# Patient Record
Sex: MALE | Race: ASIAN | NOT HISPANIC OR LATINO | URBAN - METROPOLITAN AREA
[De-identification: names, ages, dates, MRNs, and addresses within clinical notes are randomized per-mention and may not be internally consistent; named-entity substitution may affect disease eponyms.]

---

## 2017-08-10 ENCOUNTER — EMERGENCY (EMERGENCY)
Facility: HOSPITAL | Age: 37
LOS: 1 days | Discharge: HOME | End: 2017-08-10
Attending: EMERGENCY MEDICINE | Admitting: EMERGENCY MEDICINE
Payer: MEDICAID

## 2017-08-10 VITALS
OXYGEN SATURATION: 99 % | SYSTOLIC BLOOD PRESSURE: 106 MMHG | TEMPERATURE: 98 F | DIASTOLIC BLOOD PRESSURE: 66 MMHG | HEART RATE: 55 BPM | RESPIRATION RATE: 18 BRPM

## 2017-08-10 VITALS
OXYGEN SATURATION: 98 % | TEMPERATURE: 99 F | HEART RATE: 67 BPM | SYSTOLIC BLOOD PRESSURE: 112 MMHG | DIASTOLIC BLOOD PRESSURE: 73 MMHG | RESPIRATION RATE: 16 BRPM

## 2017-08-10 LAB
ALBUMIN SERPL ELPH-MCNC: 4.9 G/DL — SIGNIFICANT CHANGE UP (ref 3.3–5)
ALP SERPL-CCNC: 104 U/L — SIGNIFICANT CHANGE UP (ref 40–120)
ALT FLD-CCNC: 36 U/L RC — SIGNIFICANT CHANGE UP (ref 10–45)
ANION GAP SERPL CALC-SCNC: 14 MMOL/L — SIGNIFICANT CHANGE UP (ref 5–17)
AST SERPL-CCNC: 29 U/L — SIGNIFICANT CHANGE UP (ref 10–40)
BASOPHILS # BLD AUTO: 0 K/UL — SIGNIFICANT CHANGE UP (ref 0–0.2)
BASOPHILS NFR BLD AUTO: 0.3 % — SIGNIFICANT CHANGE UP (ref 0–2)
BILIRUB SERPL-MCNC: 1.3 MG/DL — HIGH (ref 0.2–1.2)
BUN SERPL-MCNC: 14 MG/DL — SIGNIFICANT CHANGE UP (ref 7–23)
CALCIUM SERPL-MCNC: 9.5 MG/DL — SIGNIFICANT CHANGE UP (ref 8.4–10.5)
CHLORIDE SERPL-SCNC: 103 MMOL/L — SIGNIFICANT CHANGE UP (ref 96–108)
CO2 SERPL-SCNC: 26 MMOL/L — SIGNIFICANT CHANGE UP (ref 22–31)
CREAT SERPL-MCNC: 0.92 MG/DL — SIGNIFICANT CHANGE UP (ref 0.5–1.3)
EOSINOPHIL # BLD AUTO: 0.1 K/UL — SIGNIFICANT CHANGE UP (ref 0–0.5)
EOSINOPHIL NFR BLD AUTO: 1.6 % — SIGNIFICANT CHANGE UP (ref 0–6)
GAS PNL BLDV: SIGNIFICANT CHANGE UP
GLUCOSE SERPL-MCNC: 96 MG/DL — SIGNIFICANT CHANGE UP (ref 70–99)
HCT VFR BLD CALC: 47.2 % — SIGNIFICANT CHANGE UP (ref 39–50)
HGB BLD-MCNC: 16.2 G/DL — SIGNIFICANT CHANGE UP (ref 13–17)
LYMPHOCYTES # BLD AUTO: 0.9 K/UL — LOW (ref 1–3.3)
LYMPHOCYTES # BLD AUTO: 10.5 % — LOW (ref 13–44)
MCHC RBC-ENTMCNC: 31.4 PG — SIGNIFICANT CHANGE UP (ref 27–34)
MCHC RBC-ENTMCNC: 34.4 GM/DL — SIGNIFICANT CHANGE UP (ref 32–36)
MCV RBC AUTO: 91.3 FL — SIGNIFICANT CHANGE UP (ref 80–100)
MONOCYTES # BLD AUTO: 0.7 K/UL — SIGNIFICANT CHANGE UP (ref 0–0.9)
MONOCYTES NFR BLD AUTO: 8 % — SIGNIFICANT CHANGE UP (ref 2–14)
NEUTROPHILS # BLD AUTO: 7.1 K/UL — SIGNIFICANT CHANGE UP (ref 1.8–7.4)
NEUTROPHILS NFR BLD AUTO: 79.6 % — HIGH (ref 43–77)
PLATELET # BLD AUTO: 166 K/UL — SIGNIFICANT CHANGE UP (ref 150–400)
POTASSIUM SERPL-MCNC: 5 MMOL/L — SIGNIFICANT CHANGE UP (ref 3.5–5.3)
POTASSIUM SERPL-SCNC: 5 MMOL/L — SIGNIFICANT CHANGE UP (ref 3.5–5.3)
PROT SERPL-MCNC: 7.8 G/DL — SIGNIFICANT CHANGE UP (ref 6–8.3)
RBC # BLD: 5.16 M/UL — SIGNIFICANT CHANGE UP (ref 4.2–5.8)
RBC # FLD: 11.6 % — SIGNIFICANT CHANGE UP (ref 10.3–14.5)
SODIUM SERPL-SCNC: 143 MMOL/L — SIGNIFICANT CHANGE UP (ref 135–145)
WBC # BLD: 8.9 K/UL — SIGNIFICANT CHANGE UP (ref 3.8–10.5)
WBC # FLD AUTO: 8.9 K/UL — SIGNIFICANT CHANGE UP (ref 3.8–10.5)

## 2017-08-10 PROCEDURE — 82947 ASSAY GLUCOSE BLOOD QUANT: CPT

## 2017-08-10 PROCEDURE — 96375 TX/PRO/DX INJ NEW DRUG ADDON: CPT

## 2017-08-10 PROCEDURE — 85027 COMPLETE CBC AUTOMATED: CPT

## 2017-08-10 PROCEDURE — 74177 CT ABD & PELVIS W/CONTRAST: CPT

## 2017-08-10 PROCEDURE — 96374 THER/PROPH/DIAG INJ IV PUSH: CPT | Mod: XU

## 2017-08-10 PROCEDURE — 99285 EMERGENCY DEPT VISIT HI MDM: CPT | Mod: 25

## 2017-08-10 PROCEDURE — 99284 EMERGENCY DEPT VISIT MOD MDM: CPT | Mod: 25

## 2017-08-10 PROCEDURE — 85014 HEMATOCRIT: CPT

## 2017-08-10 PROCEDURE — 82435 ASSAY OF BLOOD CHLORIDE: CPT

## 2017-08-10 PROCEDURE — 80053 COMPREHEN METABOLIC PANEL: CPT

## 2017-08-10 PROCEDURE — 82803 BLOOD GASES ANY COMBINATION: CPT

## 2017-08-10 PROCEDURE — 84132 ASSAY OF SERUM POTASSIUM: CPT

## 2017-08-10 PROCEDURE — 83605 ASSAY OF LACTIC ACID: CPT

## 2017-08-10 PROCEDURE — 84295 ASSAY OF SERUM SODIUM: CPT

## 2017-08-10 PROCEDURE — 74177 CT ABD & PELVIS W/CONTRAST: CPT | Mod: 26

## 2017-08-10 PROCEDURE — 82330 ASSAY OF CALCIUM: CPT

## 2017-08-10 RX ORDER — SODIUM CHLORIDE 9 MG/ML
1000 INJECTION INTRAMUSCULAR; INTRAVENOUS; SUBCUTANEOUS ONCE
Qty: 0 | Refills: 0 | Status: COMPLETED | OUTPATIENT
Start: 2017-08-10 | End: 2017-08-10

## 2017-08-10 RX ORDER — PIPERACILLIN AND TAZOBACTAM 4; .5 G/20ML; G/20ML
3.38 INJECTION, POWDER, LYOPHILIZED, FOR SOLUTION INTRAVENOUS ONCE
Qty: 0 | Refills: 0 | Status: COMPLETED | OUTPATIENT
Start: 2017-08-10 | End: 2017-08-10

## 2017-08-10 RX ORDER — KETOROLAC TROMETHAMINE 30 MG/ML
30 SYRINGE (ML) INJECTION ONCE
Qty: 0 | Refills: 0 | Status: DISCONTINUED | OUTPATIENT
Start: 2017-08-10 | End: 2017-08-10

## 2017-08-10 RX ADMIN — Medication 30 MILLIGRAM(S): at 07:05

## 2017-08-10 RX ADMIN — SODIUM CHLORIDE 1000 MILLILITER(S): 9 INJECTION INTRAMUSCULAR; INTRAVENOUS; SUBCUTANEOUS at 06:14

## 2017-08-10 RX ADMIN — PIPERACILLIN AND TAZOBACTAM 200 GRAM(S): 4; .5 INJECTION, POWDER, LYOPHILIZED, FOR SOLUTION INTRAVENOUS at 11:35

## 2017-08-10 RX ADMIN — Medication 30 MILLIGRAM(S): at 06:56

## 2017-08-10 NOTE — CONSULT NOTE ADULT - ATTENDING COMMENTS
Agree with A/P. Discussed with resident. If pt can tolerate po, can be d/c home with po antibiotics with instructions to return to ED if symptoms worsen, and to f/u with PMD in 1-2 weeks and/or surgery office.

## 2017-08-10 NOTE — ED ADULT NURSE NOTE - OBJECTIVE STATEMENT
35 y/o M, A&Ox3, enters ED w/ c/o abd. pain. Pt. reports pain started 2 nights ago after eating calvin chicken. As per pt., wife thought chicken may have been undercooked but wife and children ate chicken and did not experience any similar symptoms. Pt. reports he thought he had diarrhea but realized he did not. Last BM was around 0100 on 8/10. Denies blood in stool. Denies hematuria/dysuria/fever/chills/n/v/SOB/chest pain. No back pain. Pt. reports pain is at a 2-3 when there is no pressure but goes up to a 7 or 8 upon palpation. Pt. states pains come and go and describes them as "shooting." Tender to the RLQ - pt. still has appendix. Negative rebound tenderness. Active bs in all 4 quadrants. Skin warm, dry & intact.

## 2017-08-10 NOTE — CONSULT NOTE ADULT - SUBJECTIVE AND OBJECTIVE BOX
ACUTE CARE SURGERY CONSULT    Consulting surgical team: [facility] - Acute Care Surgery (pager [phone])  Consulting attending: Lebron  Patient seen and examined: 08-10-17 @ 10:10    HPI:  Patient is a 36y Male, h/o Hep B (on tenofovir disoproxil), presents reporting 2 days of abdominal pain. Began as diffuse bloating/discomfort after dinner (3/10) and localized to the RLQ last evening (sharp, intermittent pain, 7/10). One episode of diarrhea soon after pain began, no N/V, last meal was breakfast this morning. Denies fevers/chills. Passing flatus.   In addition, reports that he was seen by a cardiologist (whose name he cannot recall) after referral from his PMD (Mo June, [phone]) for an issue that was seen on his EKG during routine check up. Reports no exercise intolerance, SOB or difficulty with activities of daily living.      PAST MEDICAL HISTORY:  Hepatitis B      PAST SURGICAL HISTORY:  No significant past surgical history      ALLERGIES:  No Known Allergies      MEDICATIONS  (STANDING):  piperacillin/tazobactam IVPB. 3.375 Gram(s) IV Intermittent once    MEDICATIONS  (PRN):      VITALS & I/Os:  Vital Signs Last 24 Hrs  T(C): 36.4 (10 Aug 2017 07:24), Max: 37.2 (10 Aug 2017 04:30)  T(F): 97.5 (10 Aug 2017 07:24), Max: 98.9 (10 Aug 2017 04:30)  HR: 63 (10 Aug 2017 07:24) (63 - 67)  BP: 95/65 (10 Aug 2017 07:24) (95/65 - 112/73)  BP(mean): --  RR: 16 (10 Aug 2017 07:24) (16 - 16)  SpO2: 98% (10 Aug 2017 07:24) (98% - 99%)  CAPILLARY BLOOD GLUCOSE        GEN: NAD, alert and oriented x 3  HEENT: WNL  CHEST: Symmetrical chest rise, breath sounds CTAB  HEART: RRR, non-muffled heart sounds  ABD: Soft, non-distended. Tender to percussion and palpation in RLQ, no rebound, no guarding. Rovsing's sign absent.  EXT/VASC:         LABS:                        16.2   8.9   )-----------( 166      ( 10 Aug 2017 05:52 )             47.2     08-10    143  |  103  |  14  ----------------------------<  96  5.0   |  26  |  0.92    Ca    9.5      10 Aug 2017 05:22    TPro  7.8  /  Alb  4.9  /  TBili  1.3<H>  /  DBili  x   /  AST  29  /  ALT  36  /  AlkPhos  104  08-10    Lactate: piperacillin/tazobactam IVPB. 3.375 Gram(s) IV Intermittent once             IMAGING:  ACUTE CARE SURGERY CONSULT    Consulting surgical team: [facility] - Acute Care Surgery (pager [phone])  Consulting attending:  Patient seen and examined: 08-10-17 @ 10:10    HPI:  Patient is a 36y Male    HPI:      PAST MEDICAL HISTORY:  Hepatitis B      PAST SURGICAL HISTORY:  No significant past surgical history      ALLERGIES:  No Known Allergies      MEDICATIONS  (STANDING):  piperacillin/tazobactam IVPB. 3.375 Gram(s) IV Intermittent once    MEDICATIONS  (PRN):      VITALS & I/Os:  Vital Signs Last 24 Hrs  T(C): 36.4 (10 Aug 2017 07:24), Max: 37.2 (10 Aug 2017 04:30)  T(F): 97.5 (10 Aug 2017 07:24), Max: 98.9 (10 Aug 2017 04:30)  HR: 63 (10 Aug 2017 07:24) (63 - 67)  BP: 95/65 (10 Aug 2017 07:24) (95/65 - 112/73)  BP(mean): --  RR: 16 (10 Aug 2017 07:24) (16 - 16)  SpO2: 98% (10 Aug 2017 07:24) (98% - 99%)  CAPILLARY BLOOD GLUCOSE          I&O's Summary        GEN: NAD, alert and oriented x 3  HEENT: WNL  CHEST: Symmetrical chest rise, breath sounds CTAB  HEART: RRR, non-muffled heart sounds  ABD: Soft, non-tender, non-distended  EXT/VASC:         LABS:                        16.2   8.9   )-----------( 166      ( 10 Aug 2017 05:52 )             47.2     08-10    143  |  103  |  14  ----------------------------<  96  5.0   |  26  |  0.92    Ca    9.5      10 Aug 2017 05:22    TPro  7.8  /  Alb  4.9  /  TBili  1.3<H>  /  DBili  x   /  AST  29  /  ALT  36  /  AlkPhos  104  08-10    Lactate: piperacillin/tazobactam IVPB. 3.375 Gram(s) IV Intermittent once             IMAGING:  < from: CT Abdomen and Pelvis w/ Oral Cont and w/ IV Cont (08.10.17 @ 08:39) >  BOWEL: Inflammatory changes in the right lower quadrant are centered   anteriorly around what appears to represent a cecal diverticulum (series   2, image 77), with adjacent linear thickening of the peritoneal   reflection. The appendix is located more posteriorly, away from   inflammatory process, and is slightly dilated proximally to 7-8 mm,   although the appendiceal body and tip are normal caliber. The appendix   however does not fill with oral contrast. No calcified appendicolith is   noted. No bowel obstruction.     < end of copied text >  < from: CT Abdomen and Pelvis w/ Oral Cont and w/ IV Cont (08.10.17 @ 08:39) >    IMPRESSION:   Right lower quadrant inflammatory changes, which appear to be more   centered around an anterior cecal diverticulum or epiploic appendage.   However, the appendix is slightly dilated and does not fill with oral   contrast. Cecal diverticulitis or epiploic appendagitis are favored over   acute appendicitis in the differential diagnosis. Correlation with   clinical parameters and close follow-up are necessary.    < end of copied text >      ASSESSMENT & PLAN   36y with cecal diverticulitis/appendagitis vs. acute appendicitis. Will discuss with attending.      Discussed with    ASSESSMENT & PLAN   36y      Discussed with ACUTE CARE SURGERY CONSULT    Consulting surgical team: [facility] - Acute Care Surgery (pager [phone])  Consulting attending: Lebron  Patient seen and examined: 08-10-17 @ 10:10    HPI:  Patient is a 36y Male, h/o Hep B (on tenofovir disoproxil), presents reporting 2 days of abdominal pain. Began as diffuse bloating/discomfort after dinner (3/10) and localized to the RLQ last evening (sharp, intermittent pain, 7/10). One episode of diarrhea soon after pain began, no N/V, last meal was breakfast this morning. Denies fevers/chills. Passing flatus.   In addition, reports that he was seen by a cardiologist (whose name he cannot recall) after referral from his PMD (Mo June, [phone]) for an issue that was seen on his EKG during routine check up. Reports no exercise intolerance, SOB or difficulty with activities of daily living.      PAST MEDICAL HISTORY:  Hepatitis B      PAST SURGICAL HISTORY:  No significant past surgical history      ALLERGIES:  No Known Allergies      MEDICATIONS  (STANDING):  piperacillin/tazobactam IVPB. 3.375 Gram(s) IV Intermittent once    MEDICATIONS  (PRN):      VITALS & I/Os:  Vital Signs Last 24 Hrs  T(C): 36.4 (10 Aug 2017 07:24), Max: 37.2 (10 Aug 2017 04:30)  T(F): 97.5 (10 Aug 2017 07:24), Max: 98.9 (10 Aug 2017 04:30)  HR: 63 (10 Aug 2017 07:24) (63 - 67)  BP: 95/65 (10 Aug 2017 07:24) (95/65 - 112/73)  BP(mean): --  RR: 16 (10 Aug 2017 07:24) (16 - 16)  SpO2: 98% (10 Aug 2017 07:24) (98% - 99%)  CAPILLARY BLOOD GLUCOSE        GEN: NAD, alert and oriented x 3  HEENT: WNL  CHEST: Symmetrical chest rise, breath sounds CTAB  HEART: RRR, non-muffled heart sounds  ABD: Soft, non-distended. Tender to percussion and palpation in RLQ, no rebound, no guarding. Rovsing's sign absent.  EXT/VASC:         LABS:                        16.2   8.9   )-----------( 166      ( 10 Aug 2017 05:52 )             47.2     08-10    143  |  103  |  14  ----------------------------<  96  5.0   |  26  |  0.92    Ca    9.5      10 Aug 2017 05:22    TPro  7.8  /  Alb  4.9  /  TBili  1.3<H>  /  DBili  x   /  AST  29  /  ALT  36  /  AlkPhos  104  08-10    Lactate: piperacillin/tazobactam IVPB. 3.375 Gram(s) IV Intermittent once             IMAGING:  ACUTE CARE SURGERY CONSULT    Consulting surgical team: [facility] - Acute Care Surgery (pager [phone])  Consulting attending:  Patient seen and examined: 08-10-17 @ 10:10    HPI:  Patient is a 36y Male    HPI:      PAST MEDICAL HISTORY:  Hepatitis B      PAST SURGICAL HISTORY:  No significant past surgical history      ALLERGIES:  No Known Allergies      MEDICATIONS  (STANDING):  piperacillin/tazobactam IVPB. 3.375 Gram(s) IV Intermittent once    MEDICATIONS  (PRN):      VITALS & I/Os:  Vital Signs Last 24 Hrs  T(C): 36.4 (10 Aug 2017 07:24), Max: 37.2 (10 Aug 2017 04:30)  T(F): 97.5 (10 Aug 2017 07:24), Max: 98.9 (10 Aug 2017 04:30)  HR: 63 (10 Aug 2017 07:24) (63 - 67)  BP: 95/65 (10 Aug 2017 07:24) (95/65 - 112/73)  BP(mean): --  RR: 16 (10 Aug 2017 07:24) (16 - 16)  SpO2: 98% (10 Aug 2017 07:24) (98% - 99%)  CAPILLARY BLOOD GLUCOSE          I&O's Summary        GEN: NAD, alert and oriented x 3  HEENT: WNL  CHEST: Symmetrical chest rise, breath sounds CTAB  HEART: RRR, non-muffled heart sounds  ABD: Soft, non-tender, non-distended  EXT/VASC:         LABS:                        16.2   8.9   )-----------( 166      ( 10 Aug 2017 05:52 )             47.2     08-10    143  |  103  |  14  ----------------------------<  96  5.0   |  26  |  0.92    Ca    9.5      10 Aug 2017 05:22    TPro  7.8  /  Alb  4.9  /  TBili  1.3<H>  /  DBili  x   /  AST  29  /  ALT  36  /  AlkPhos  104  08-10    Lactate: piperacillin/tazobactam IVPB. 3.375 Gram(s) IV Intermittent once             IMAGING:  < from: CT Abdomen and Pelvis w/ Oral Cont and w/ IV Cont (08.10.17 @ 08:39) >  BOWEL: Inflammatory changes in the right lower quadrant are centered   anteriorly around what appears to represent a cecal diverticulum (series   2, image 77), with adjacent linear thickening of the peritoneal   reflection. The appendix is located more posteriorly, away from   inflammatory process, and is slightly dilated proximally to 7-8 mm,   although the appendiceal body and tip are normal caliber. The appendix   however does not fill with oral contrast. No calcified appendicolith is   noted. No bowel obstruction.     < end of copied text >  < from: CT Abdomen and Pelvis w/ Oral Cont and w/ IV Cont (08.10.17 @ 08:39) >    IMPRESSION:   Right lower quadrant inflammatory changes, which appear to be more   centered around an anterior cecal diverticulum or epiploic appendage.   However, the appendix is slightly dilated and does not fill with oral   contrast. Cecal diverticulitis or epiploic appendagitis are favored over   acute appendicitis in the differential diagnosis. Correlation with   clinical parameters and close follow-up are necessary.    < end of copied text >      ASSESSMENT & PLAN   36y with cecal diverticulitis/appendagitis vs. acute appendicitis. Given anatomy on imaging, low suspicion for acute appendicitis. Recommend course of PO antibiotics (augmentin) and follow up with his PMD (Mo June). If he has worsening pain or fever at home, he should report back to the ED for re-evaluation.      Discussed with attending, Dr. Diana

## 2017-08-10 NOTE — ED ADULT NURSE REASSESSMENT NOTE - NS ED NURSE REASSESS COMMENT FT1
Report received from JAJA Orellana. Pt. A+Ox3, appears comfortable in stretcher. Breathing unlabored on RA. Pt. reports improvement in RLQ pain. Afebrile at this time. Denies N/V. Comfort and safety maintained. Pending CT scan.

## 2017-08-10 NOTE — ED PROVIDER NOTE - ATTENDING CONTRIBUTION TO CARE
I was physically present for the E/M service provided. I agree with above history, physical, and plan which I have reviewed and edited where appropriate. I was physically present for the key portions of the service provided.    36M PMH HepB p/w 2 days of RLQ pain a/w loose stools. No N/V.  -NAD, abdomen soft w/ focal RLQ pain no rebound  -CT a/p r/o appendicitis  -Labs, NPO

## 2017-08-10 NOTE — ED PROVIDER NOTE - NOTES
Spoke with surgery, recommend Augmentin, unlikely appendicitis at this time. Will DC home with strict return precautions and gastroenterology follow up. Zoe Núñez DO

## 2017-08-10 NOTE — ED ADULT NURSE REASSESSMENT NOTE - NS ED NURSE REASSESS COMMENT FT1
pt. in no acute distress. ambulatory to bathroom. reports improvement in pain. warm blankets provided. pending dispo.

## 2017-08-10 NOTE — ED PROVIDER NOTE - MEDICAL DECISION MAKING DETAILS
36M PMH hepatitis b p/w RLQ pain concerning for acute appendicitis. Will check CBC CMP and CT abd/pelvis

## 2017-08-10 NOTE — ED PROVIDER NOTE - OBJECTIVE STATEMENT
36M PMH Hepatitis B dx as 11y old p/w RLQ pain. Pain started 2 days ago as generalized abd pain, associated with an episode of loose stools. Pain has been getting worse and is now sharp pain in the RLQ. No associated n/v, fevers, chills, cough, recent travel or new foods. Denies bloody stool or melena.

## 2017-09-01 ENCOUNTER — TRANSCRIPTION ENCOUNTER (OUTPATIENT)
Age: 37
End: 2017-09-01

## 2018-03-01 NOTE — ED ADULT TRIAGE NOTE - ESI TRIAGE ACUITY LEVEL, MLM
Problem: Patient Care Overview  Goal: Plan of Care Review  Outcome: Ongoing (interventions implemented as appropriate)  Pt ambulates independently and remained free from falls and injury. Pt with frequent complaints of pain to mouth during shift, prn medications given per orders and Dr. Santiago notified. Pt had 2 liquid BMs overnight. Vital signs remained stable. Bed in lowest position, nonskid footwear on pt when out of bed, call light and possessions within reach, side rails up x2. Pt voices understanding to call for assistance. Will continue to monitor.           3

## 2020-06-17 NOTE — ED ADULT NURSE NOTE - NS ED NURSE RECORD ANOTHER HT AND WT
No oropharyngeal dysphagia due to  dementia   -Peg placed on 06/02  -Tolerates feeding  -NPO. continue meds through peg tube

## 2020-11-03 PROBLEM — B19.10 UNSPECIFIED VIRAL HEPATITIS B WITHOUT HEPATIC COMA: Chronic | Status: ACTIVE | Noted: 2017-08-10

## 2020-11-25 ENCOUNTER — LABORATORY RESULT (OUTPATIENT)
Age: 40
End: 2020-11-25

## 2020-11-25 ENCOUNTER — NON-APPOINTMENT (OUTPATIENT)
Age: 40
End: 2020-11-25

## 2020-11-25 ENCOUNTER — TRANSCRIPTION ENCOUNTER (OUTPATIENT)
Age: 40
End: 2020-11-25

## 2020-11-25 ENCOUNTER — APPOINTMENT (OUTPATIENT)
Dept: PULMONOLOGY | Facility: CLINIC | Age: 40
End: 2020-11-25
Payer: COMMERCIAL

## 2020-11-25 VITALS
SYSTOLIC BLOOD PRESSURE: 109 MMHG | DIASTOLIC BLOOD PRESSURE: 71 MMHG | HEIGHT: 67 IN | RESPIRATION RATE: 16 BRPM | HEART RATE: 73 BPM | OXYGEN SATURATION: 95 % | BODY MASS INDEX: 26.68 KG/M2 | TEMPERATURE: 97.9 F | WEIGHT: 170 LBS

## 2020-11-25 LAB
BILIRUB UR QL STRIP: NORMAL
CLARITY UR: CLEAR
COLLECTION METHOD: NORMAL
GLUCOSE UR-MCNC: NORMAL
HCG UR QL: 0.2 EU/DL
HGB UR QL STRIP.AUTO: NORMAL
KETONES UR-MCNC: NORMAL
LEUKOCYTE ESTERASE UR QL STRIP: NORMAL
NITRITE UR QL STRIP: NORMAL
PH UR STRIP: 6
PROT UR STRIP-MCNC: NORMAL
SP GR UR STRIP: 1.01

## 2020-11-25 PROCEDURE — 93000 ELECTROCARDIOGRAM COMPLETE: CPT

## 2020-11-25 PROCEDURE — 36415 COLL VENOUS BLD VENIPUNCTURE: CPT

## 2020-11-25 PROCEDURE — 81003 URINALYSIS AUTO W/O SCOPE: CPT | Mod: QW

## 2020-11-25 PROCEDURE — 90686 IIV4 VACC NO PRSV 0.5 ML IM: CPT

## 2020-11-25 PROCEDURE — 82044 UR ALBUMIN SEMIQUANTITATIVE: CPT | Mod: QW

## 2020-11-25 PROCEDURE — 99385 PREV VISIT NEW AGE 18-39: CPT | Mod: 25

## 2020-11-25 PROCEDURE — G0008: CPT

## 2020-11-25 NOTE — DATA REVIEWED
[FreeTextEntry1] : EKG shows sinus rhythm at 64 bpm, no acute ST changes.\par \par \par  POCT - A Urinalysis Dip (Automated)             Final\par \par No Documents Attached\par \par \par   Test   Result   Flag Reference Goal Last Verified \par   Glucose neg      REQUIRED \par   Bilirubin neg      REQUIRED \par   Ketone neg      REQUIRED \par   Specific Gravity 1.015      REQUIRED \par   Blood trace-intact      REQUIRED \par   pH 6.0      REQUIRED \par   Protein neg      REQUIRED \par   Urobilinogen 0.2 EU/dl      REQUIRED \par   Nitrite neg      REQUIRED \par   Leukocytes neg      REQUIRED \par   Clarity Clear      REQUIRED \par   Collection Method Void      REQUIRED \par \par  Ordered by: MINOO LUNDBERG       Collected/Examined: 25Nov2020 12:37PM       \par Verification Required       Stage: Final       \par  Performed at: In Office       Performed by: MINOO LUNDBERG       Resulted: 25Nov2020 12:37PM       Last Updated: 25Nov2020 12:39PM       \par

## 2020-11-25 NOTE — HISTORY OF PRESENT ILLNESS
[FreeTextEntry1] : Mr. Stewart is a pleasant 40-year-old gentleman with history of chronic hepatitis B infection on Tenofovir, he also has history of gallbladder polyp, he is here for annual physical examination, he has no complaints.

## 2020-11-25 NOTE — PLAN
[FreeTextEntry1] : Venipuncture with labs drawn in office.\par Urine sent for microscopic hematuria.\par Get right upper quadrant ultrasound for further evaluation of chronic hepatitis B infection and gallbladder polyp.\par Continue tenofovir year for history of chronic hepatitis B infection for now.\par Fluarix vaccine given today.

## 2020-11-28 LAB
25(OH)D3 SERPL-MCNC: 27.4 NG/ML
ALBUMIN SERPL ELPH-MCNC: 4.9 G/DL
ALBUMIN: 10
ALP BLD-CCNC: 141 U/L
ALT SERPL-CCNC: 36 U/L
ANION GAP SERPL CALC-SCNC: 11 MMOL/L
APPEARANCE: CLEAR
AST SERPL-CCNC: 25 U/L
BACTERIA: NEGATIVE
BASOPHILS # BLD AUTO: 0.06 K/UL
BASOPHILS NFR BLD AUTO: 0.8 %
BILIRUB SERPL-MCNC: 0.8 MG/DL
BILIRUBIN URINE: NEGATIVE
BLOOD URINE: NEGATIVE
BUN SERPL-MCNC: 13 MG/DL
CALCIUM SERPL-MCNC: 9.3 MG/DL
CHLORIDE SERPL-SCNC: 104 MMOL/L
CHOLEST SERPL-MCNC: 145 MG/DL
CO2 SERPL-SCNC: 24 MMOL/L
COLOR: NORMAL
CREAT SERPL-MCNC: 0.71 MG/DL
CREAT SPEC-SCNC: 106 MG/DL
CREATININE: 100
EOSINOPHIL # BLD AUTO: 0.2 K/UL
EOSINOPHIL NFR BLD AUTO: 2.6 %
GLUCOSE QUALITATIVE U: NEGATIVE
GLUCOSE SERPL-MCNC: 149 MG/DL
HBV CORE IGG+IGM SER QL: REACTIVE
HBV CORE IGM SER QL: NONREACTIVE
HBV DNA # SERPL NAA+PROBE: NOT DETECTED IU/ML
HBV E AB SER QL: NEGATIVE
HBV E AG SER QL: NEGATIVE
HBV SURFACE AB SER QL: NONREACTIVE
HBV SURFACE AG SER QL: REACTIVE
HCT VFR BLD CALC: 47.8 %
HCV AB SER QL: NONREACTIVE
HCV S/CO RATIO: 0.07 S/CO
HDLC SERPL-MCNC: 43 MG/DL
HEPB DNA PCR LOG: NOT DETECTED LOG10IU/ML
HGB BLD-MCNC: 16.1 G/DL
HYALINE CASTS: 0 /LPF
IMM GRANULOCYTES NFR BLD AUTO: 0.1 %
KETONES URINE: NEGATIVE
LDLC SERPL CALC-MCNC: 90 MG/DL
LEUKOCYTE ESTERASE URINE: NEGATIVE
LYMPHOCYTES # BLD AUTO: 1.16 K/UL
LYMPHOCYTES NFR BLD AUTO: 14.8 %
MAGNESIUM SERPL-MCNC: 2.2 MG/DL
MAN DIFF?: NORMAL
MCHC RBC-ENTMCNC: 30.1 PG
MCHC RBC-ENTMCNC: 33.7 GM/DL
MCV RBC AUTO: 89.3 FL
MICROALBUMIN 24H UR DL<=1MG/L-MCNC: <1.2 MG/DL
MICROALBUMIN/CREAT 24H UR-RTO: NORMAL MG/G
MICROALBUMIN/CREAT UR TEST STR-RTO: <30
MICROSCOPIC-UA: NORMAL
MONOCYTES # BLD AUTO: 0.47 K/UL
MONOCYTES NFR BLD AUTO: 6 %
NEUTROPHILS # BLD AUTO: 5.92 K/UL
NEUTROPHILS NFR BLD AUTO: 75.7 %
NITRITE URINE: NEGATIVE
NONHDLC SERPL-MCNC: 102 MG/DL
PH URINE: 6
PHOSPHATE SERPL-MCNC: 3.6 MG/DL
PLATELET # BLD AUTO: 239 K/UL
POTASSIUM SERPL-SCNC: 4 MMOL/L
PROT SERPL-MCNC: 7.3 G/DL
PROTEIN URINE: NEGATIVE
PSA SERPL-MCNC: 1.42 NG/ML
RBC # BLD: 5.35 M/UL
RBC # FLD: 11.9 %
RED BLOOD CELLS URINE: 0 /HPF
SODIUM SERPL-SCNC: 139 MMOL/L
SPECIFIC GRAVITY URINE: 1.02
SQUAMOUS EPITHELIAL CELLS: 0 /HPF
T3 SERPL-MCNC: 125 NG/DL
T3RU NFR SERPL: 0.9 TBI
T4 FREE SERPL-MCNC: 1.2 NG/DL
T4 SERPL-MCNC: 7.1 UG/DL
TRIGL SERPL-MCNC: 59 MG/DL
TSH SERPL-ACNC: 0.63 UIU/ML
UROBILINOGEN URINE: NORMAL
WBC # FLD AUTO: 7.82 K/UL
WHITE BLOOD CELLS URINE: 0 /HPF

## 2020-12-07 ENCOUNTER — APPOINTMENT (OUTPATIENT)
Dept: ULTRASOUND IMAGING | Facility: CLINIC | Age: 40
End: 2020-12-07
Payer: COMMERCIAL

## 2020-12-07 ENCOUNTER — OUTPATIENT (OUTPATIENT)
Dept: OUTPATIENT SERVICES | Facility: HOSPITAL | Age: 40
LOS: 1 days | End: 2020-12-07
Payer: COMMERCIAL

## 2020-12-07 DIAGNOSIS — Z00.8 ENCOUNTER FOR OTHER GENERAL EXAMINATION: ICD-10-CM

## 2020-12-07 PROCEDURE — 76700 US EXAM ABDOM COMPLETE: CPT | Mod: 26

## 2020-12-07 PROCEDURE — 76700 US EXAM ABDOM COMPLETE: CPT

## 2021-04-29 ENCOUNTER — APPOINTMENT (OUTPATIENT)
Dept: PULMONOLOGY | Facility: CLINIC | Age: 41
End: 2021-04-29
Payer: COMMERCIAL

## 2021-04-29 VITALS
OXYGEN SATURATION: 95 % | DIASTOLIC BLOOD PRESSURE: 63 MMHG | SYSTOLIC BLOOD PRESSURE: 98 MMHG | TEMPERATURE: 98.1 F | HEART RATE: 72 BPM

## 2021-04-29 PROCEDURE — 99072 ADDL SUPL MATRL&STAF TM PHE: CPT

## 2021-04-29 PROCEDURE — 99214 OFFICE O/P EST MOD 30 MIN: CPT | Mod: 25

## 2021-04-29 PROCEDURE — 36415 COLL VENOUS BLD VENIPUNCTURE: CPT

## 2021-05-02 LAB
25(OH)D3 SERPL-MCNC: 39 NG/ML
ALBUMIN SERPL ELPH-MCNC: 4.8 G/DL
ALP BLD-CCNC: 108 U/L
ALT SERPL-CCNC: 33 U/L
ANION GAP SERPL CALC-SCNC: 13 MMOL/L
AST SERPL-CCNC: 24 U/L
BILIRUB SERPL-MCNC: 0.8 MG/DL
BUN SERPL-MCNC: 14 MG/DL
CALCIUM SERPL-MCNC: 9.3 MG/DL
CHLORIDE SERPL-SCNC: 104 MMOL/L
CO2 SERPL-SCNC: 23 MMOL/L
CREAT SERPL-MCNC: 0.76 MG/DL
ESTIMATED AVERAGE GLUCOSE: 97 MG/DL
GLUCOSE SERPL-MCNC: 84 MG/DL
HBA1C MFR BLD HPLC: 5 %
POTASSIUM SERPL-SCNC: 4.1 MMOL/L
PROT SERPL-MCNC: 7.2 G/DL
SODIUM SERPL-SCNC: 140 MMOL/L

## 2021-10-01 ENCOUNTER — APPOINTMENT (OUTPATIENT)
Dept: PULMONOLOGY | Facility: CLINIC | Age: 41
End: 2021-10-01
Payer: COMMERCIAL

## 2021-10-01 ENCOUNTER — RESULT REVIEW (OUTPATIENT)
Age: 41
End: 2021-10-01

## 2021-10-01 VITALS
HEART RATE: 63 BPM | OXYGEN SATURATION: 97 % | TEMPERATURE: 97.3 F | SYSTOLIC BLOOD PRESSURE: 116 MMHG | RESPIRATION RATE: 16 BRPM | DIASTOLIC BLOOD PRESSURE: 73 MMHG

## 2021-10-01 PROCEDURE — 90686 IIV4 VACC NO PRSV 0.5 ML IM: CPT

## 2021-10-01 PROCEDURE — 36415 COLL VENOUS BLD VENIPUNCTURE: CPT

## 2021-10-01 PROCEDURE — 99214 OFFICE O/P EST MOD 30 MIN: CPT | Mod: 25

## 2021-10-01 PROCEDURE — G0008: CPT

## 2021-10-01 NOTE — PLAN
[FreeTextEntry1] : Venipuncture with labs drawn in office\par Medications reviewed. Continue present medications.\par Fluzone vaccine given today.\par Get abdominal ultrasound for follow-up.\par Return in 2 months for annual physical examination.

## 2021-10-03 LAB
ALBUMIN SERPL ELPH-MCNC: 4.8 G/DL
ALP BLD-CCNC: 103 U/L
ALT SERPL-CCNC: 46 U/L
ANION GAP SERPL CALC-SCNC: 14 MMOL/L
AST SERPL-CCNC: 26 U/L
BILIRUB SERPL-MCNC: 0.9 MG/DL
BUN SERPL-MCNC: 16 MG/DL
CALCIUM SERPL-MCNC: 9.3 MG/DL
CHLORIDE SERPL-SCNC: 102 MMOL/L
CO2 SERPL-SCNC: 23 MMOL/L
CREAT SERPL-MCNC: 0.76 MG/DL
ESTIMATED AVERAGE GLUCOSE: 97 MG/DL
GLUCOSE SERPL-MCNC: 88 MG/DL
HBA1C MFR BLD HPLC: 5 %
POTASSIUM SERPL-SCNC: 4.2 MMOL/L
PROT SERPL-MCNC: 7.3 G/DL
SODIUM SERPL-SCNC: 139 MMOL/L

## 2022-01-19 ENCOUNTER — OUTPATIENT (OUTPATIENT)
Dept: OUTPATIENT SERVICES | Facility: HOSPITAL | Age: 42
LOS: 1 days | End: 2022-01-19
Payer: COMMERCIAL

## 2022-01-19 ENCOUNTER — APPOINTMENT (OUTPATIENT)
Dept: ULTRASOUND IMAGING | Facility: CLINIC | Age: 42
End: 2022-01-19
Payer: COMMERCIAL

## 2022-01-19 DIAGNOSIS — K82.4 CHOLESTEROLOSIS OF GALLBLADDER: ICD-10-CM

## 2022-01-19 PROCEDURE — 76705 ECHO EXAM OF ABDOMEN: CPT

## 2022-01-19 PROCEDURE — 76705 ECHO EXAM OF ABDOMEN: CPT | Mod: 26

## 2022-03-29 ENCOUNTER — LABORATORY RESULT (OUTPATIENT)
Age: 42
End: 2022-03-29

## 2022-03-29 ENCOUNTER — NON-APPOINTMENT (OUTPATIENT)
Age: 42
End: 2022-03-29

## 2022-03-29 ENCOUNTER — APPOINTMENT (OUTPATIENT)
Dept: PULMONOLOGY | Facility: CLINIC | Age: 42
End: 2022-03-29
Payer: COMMERCIAL

## 2022-03-29 VITALS
BODY MASS INDEX: 26.52 KG/M2 | HEART RATE: 59 BPM | HEIGHT: 68 IN | WEIGHT: 175 LBS | DIASTOLIC BLOOD PRESSURE: 76 MMHG | SYSTOLIC BLOOD PRESSURE: 116 MMHG | OXYGEN SATURATION: 97 %

## 2022-03-29 DIAGNOSIS — Z00.00 ENCOUNTER FOR GENERAL ADULT MEDICAL EXAMINATION W/OUT ABNORMAL FINDINGS: ICD-10-CM

## 2022-03-29 PROCEDURE — 93000 ELECTROCARDIOGRAM COMPLETE: CPT

## 2022-03-29 PROCEDURE — 36415 COLL VENOUS BLD VENIPUNCTURE: CPT

## 2022-03-29 PROCEDURE — 81003 URINALYSIS AUTO W/O SCOPE: CPT | Mod: QW

## 2022-03-29 PROCEDURE — 82044 UR ALBUMIN SEMIQUANTITATIVE: CPT | Mod: QW

## 2022-03-29 PROCEDURE — 99396 PREV VISIT EST AGE 40-64: CPT | Mod: 25

## 2022-03-29 NOTE — PLAN
[FreeTextEntry1] : Venipuncture with labs drawn in office\par Age-appropriate counseling and preventive care screening discussed.\par Medications reviewed. Continue present medications.\par Hepatology follow-up regarding chronic hepatitis B infection\par Annual abdominal ultrasound follow-up for stable gallbladder polyp.

## 2022-03-29 NOTE — HISTORY OF PRESENT ILLNESS
[FreeTextEntry1] : Mr. Stewart is a pleasant 41-year-old gentleman with history of chronic hepatitis B infection, gallbladder polyp, he came in for annual physical examination today, offers no complaints.

## 2022-04-03 LAB
25(OH)D3 SERPL-MCNC: 38.3 NG/ML
ALBUMIN SERPL ELPH-MCNC: 5 G/DL
ALBUMIN: 10
ALP BLD-CCNC: 95 U/L
ALT SERPL-CCNC: 39 U/L
ANION GAP SERPL CALC-SCNC: 14 MMOL/L
AST SERPL-CCNC: 25 U/L
BASOPHILS # BLD AUTO: 0.05 K/UL
BASOPHILS NFR BLD AUTO: 1 %
BILIRUB SERPL-MCNC: 1.1 MG/DL
BILIRUB UR QL STRIP: NORMAL
BUN SERPL-MCNC: 13 MG/DL
CALCIUM SERPL-MCNC: 9.4 MG/DL
CHLORIDE SERPL-SCNC: 103 MMOL/L
CHOLEST SERPL-MCNC: 186 MG/DL
CLARITY UR: CLEAR
CO2 SERPL-SCNC: 22 MMOL/L
COLLECTION METHOD: NORMAL
CREAT SERPL-MCNC: 0.78 MG/DL
CREATININE: 100
EGFR: 116 ML/MIN/1.73M2
EOSINOPHIL # BLD AUTO: 0.26 K/UL
EOSINOPHIL NFR BLD AUTO: 5.3 %
GLUCOSE SERPL-MCNC: 94 MG/DL
GLUCOSE UR-MCNC: NORMAL
HCG UR QL: 1 EU/DL
HCT VFR BLD CALC: 50.5 %
HCV AB SER QL: NONREACTIVE
HCV S/CO RATIO: 0.09 S/CO
HDLC SERPL-MCNC: 46 MG/DL
HGB BLD-MCNC: 17.1 G/DL
HGB UR QL STRIP.AUTO: NORMAL
IMM GRANULOCYTES NFR BLD AUTO: 0.2 %
KETONES UR-MCNC: NORMAL
LDLC SERPL CALC-MCNC: 124 MG/DL
LEUKOCYTE ESTERASE UR QL STRIP: NORMAL
LYMPHOCYTES # BLD AUTO: 1.05 K/UL
LYMPHOCYTES NFR BLD AUTO: 21.4 %
MAGNESIUM SERPL-MCNC: 2.1 MG/DL
MAN DIFF?: NORMAL
MCHC RBC-ENTMCNC: 29.9 PG
MCHC RBC-ENTMCNC: 33.9 GM/DL
MCV RBC AUTO: 88.4 FL
MICROALBUMIN/CREAT UR TEST STR-RTO: <30
MONOCYTES # BLD AUTO: 0.31 K/UL
MONOCYTES NFR BLD AUTO: 6.3 %
NEUTROPHILS # BLD AUTO: 3.23 K/UL
NEUTROPHILS NFR BLD AUTO: 65.8 %
NITRITE UR QL STRIP: NORMAL
NONHDLC SERPL-MCNC: 140 MG/DL
PH UR STRIP: 7
PHOSPHATE SERPL-MCNC: 3 MG/DL
PLATELET # BLD AUTO: 226 K/UL
POTASSIUM SERPL-SCNC: 4.1 MMOL/L
PROT SERPL-MCNC: 7.4 G/DL
PROT UR STRIP-MCNC: NORMAL
PSA SERPL-MCNC: 1.32 NG/ML
RBC # BLD: 5.71 M/UL
RBC # FLD: 12.7 %
SODIUM SERPL-SCNC: 139 MMOL/L
SP GR UR STRIP: 1.01
T3 SERPL-MCNC: 128 NG/DL
T3RU NFR SERPL: 0.9 TBI
T4 FREE SERPL-MCNC: 1.2 NG/DL
T4 SERPL-MCNC: 8 UG/DL
TRIGL SERPL-MCNC: 81 MG/DL
TSH SERPL-ACNC: 0.83 UIU/ML
WBC # FLD AUTO: 4.91 K/UL

## 2022-08-08 ENCOUNTER — APPOINTMENT (OUTPATIENT)
Dept: PULMONOLOGY | Facility: CLINIC | Age: 42
End: 2022-08-08

## 2022-08-08 ENCOUNTER — NON-APPOINTMENT (OUTPATIENT)
Age: 42
End: 2022-08-08

## 2022-08-08 VITALS
SYSTOLIC BLOOD PRESSURE: 122 MMHG | HEART RATE: 77 BPM | OXYGEN SATURATION: 95 % | TEMPERATURE: 98 F | DIASTOLIC BLOOD PRESSURE: 74 MMHG

## 2022-08-08 PROCEDURE — 36415 COLL VENOUS BLD VENIPUNCTURE: CPT

## 2022-08-08 PROCEDURE — 99214 OFFICE O/P EST MOD 30 MIN: CPT | Mod: 25

## 2022-08-08 PROCEDURE — 93000 ELECTROCARDIOGRAM COMPLETE: CPT

## 2022-08-08 PROCEDURE — 71046 X-RAY EXAM CHEST 2 VIEWS: CPT

## 2022-08-08 RX ORDER — IBUPROFEN 600 MG/1
600 TABLET, FILM COATED ORAL 3 TIMES DAILY
Qty: 90 | Refills: 5 | Status: ACTIVE | COMMUNITY
Start: 2022-08-08 | End: 1900-01-01

## 2022-08-08 NOTE — PROCEDURE
[FreeTextEntry1] : EKG shows sinus rhythm at 64 bpm, no acute ST changes.\par \par \par  Xray Chest 2 Views PA/Lat             Final\par \par   \par Chest x-ray PA and lateral views performed in my office today showed clear lungs, no evidence of infiltrates or pleural effusions. \par \par \par  Ordered by: MINOO LUNDBERG       Collected/Examined: [id number] 03:02PM       \par Verification Required       Stage: Final       \par  Performed at: In Office       Performed by: MINOO LUNDBERG       Resulted: [id number] 03:02PM       Last Updated: [id number] 03:03PM

## 2022-08-08 NOTE — DISCUSSION/SUMMARY
[FreeTextEntry1] : Atypical left-sided chest pain likely secondary to costochondritis, rule out PE/MI (unlikely), rule out CAD in this patient with family history of CAD

## 2022-08-08 NOTE — ASSESSMENT
[FreeTextEntry1] : Venipuncture with labs drawn in office\par Cardiology evaluation with Dr. Sheltno.\par Start ibuprofen as needed for atypical chest pain.\par Continue tenofovir for chronic hepatitis B infection.

## 2022-08-08 NOTE — HISTORY OF PRESENT ILLNESS
[TextBox_4] : Came in complaining of atypical left-sided chest pain for 3 weeks, no no shortness of breath, cough or fever.

## 2022-08-09 LAB
ALBUMIN SERPL ELPH-MCNC: 4.8 G/DL
ALP BLD-CCNC: 95 U/L
ALT SERPL-CCNC: 40 U/L
ANION GAP SERPL CALC-SCNC: 13 MMOL/L
AST SERPL-CCNC: 24 U/L
BASOPHILS # BLD AUTO: 0.1 K/UL
BASOPHILS NFR BLD AUTO: 1.7 %
BILIRUB DIRECT SERPL-MCNC: 0.2 MG/DL
BILIRUB INDIRECT SERPL-MCNC: 0.8 MG/DL
BILIRUB SERPL-MCNC: 1 MG/DL
BUN SERPL-MCNC: 15 MG/DL
CALCIUM SERPL-MCNC: 9.4 MG/DL
CHLORIDE SERPL-SCNC: 106 MMOL/L
CK MB BLD-MCNC: <1 NG/ML
CK SERPL-CCNC: 126 U/L
CO2 SERPL-SCNC: 22 MMOL/L
CREAT SERPL-MCNC: 0.8 MG/DL
DEPRECATED D DIMER PPP IA-ACNC: <150 NG/ML DDU
EGFR: 115 ML/MIN/1.73M2
EOSINOPHIL # BLD AUTO: 0.36 K/UL
EOSINOPHIL NFR BLD AUTO: 6.3 %
GLUCOSE SERPL-MCNC: 104 MG/DL
HCT VFR BLD CALC: 47 %
HGB BLD-MCNC: 16.1 G/DL
IMM GRANULOCYTES NFR BLD AUTO: 0.2 %
LYMPHOCYTES # BLD AUTO: 1.16 K/UL
LYMPHOCYTES NFR BLD AUTO: 20.1 %
MAN DIFF?: NORMAL
MCHC RBC-ENTMCNC: 30.2 PG
MCHC RBC-ENTMCNC: 34.3 GM/DL
MCV RBC AUTO: 88.2 FL
MONOCYTES # BLD AUTO: 0.46 K/UL
MONOCYTES NFR BLD AUTO: 8 %
NEUTROPHILS # BLD AUTO: 3.67 K/UL
NEUTROPHILS NFR BLD AUTO: 63.7 %
NT-PROBNP SERPL-MCNC: 13 PG/ML
PLATELET # BLD AUTO: 238 K/UL
POTASSIUM SERPL-SCNC: 4.1 MMOL/L
PROT SERPL-MCNC: 7.1 G/DL
RBC # BLD: 5.33 M/UL
RBC # FLD: 12.6 %
SODIUM SERPL-SCNC: 141 MMOL/L
TROPONIN-T, HIGH SENSITIVITY: <6 NG/L
WBC # FLD AUTO: 5.76 K/UL

## 2022-08-20 LAB — ERYTHROCYTE [SEDIMENTATION RATE] IN BLOOD BY WESTERGREN METHOD: <2 MM/HR

## 2022-09-30 ENCOUNTER — APPOINTMENT (OUTPATIENT)
Dept: CARDIOLOGY | Facility: CLINIC | Age: 42
End: 2022-09-30

## 2022-09-30 ENCOUNTER — NON-APPOINTMENT (OUTPATIENT)
Age: 42
End: 2022-09-30

## 2022-09-30 VITALS
TEMPERATURE: 98.3 F | OXYGEN SATURATION: 98 % | SYSTOLIC BLOOD PRESSURE: 120 MMHG | HEART RATE: 58 BPM | WEIGHT: 180 LBS | DIASTOLIC BLOOD PRESSURE: 80 MMHG | BODY MASS INDEX: 27.28 KG/M2 | HEIGHT: 68 IN

## 2022-09-30 DIAGNOSIS — Z86.19 PERSONAL HISTORY OF OTHER INFECTIOUS AND PARASITIC DISEASES: ICD-10-CM

## 2022-09-30 PROCEDURE — 99204 OFFICE O/P NEW MOD 45 MIN: CPT | Mod: 25

## 2022-09-30 PROCEDURE — 93000 ELECTROCARDIOGRAM COMPLETE: CPT

## 2022-09-30 NOTE — HISTORY OF PRESENT ILLNESS
[FreeTextEntry1] : This is a 41 year male who presents to the office as a new patient for cardiac eval for chest pain. pt reports some left sided chest heaviness on and off for many year.s seen a cardiologist > 10 years ago had stress test and it was normal\par Denies any hx of smoking cigarettes, denies any family hx of heart disease. pt also reports  rare  episodes of heart palpitations \par \par Pt denies any, SOB, dizziness, abdominal pain N/V/D fever or chills\par

## 2022-10-04 ENCOUNTER — APPOINTMENT (OUTPATIENT)
Dept: CARDIOLOGY | Facility: CLINIC | Age: 42
End: 2022-10-04

## 2022-10-04 ENCOUNTER — OUTPATIENT (OUTPATIENT)
Dept: OUTPATIENT SERVICES | Facility: HOSPITAL | Age: 42
LOS: 1 days | End: 2022-10-04
Payer: SELF-PAY

## 2022-10-04 ENCOUNTER — APPOINTMENT (OUTPATIENT)
Dept: CT IMAGING | Facility: CLINIC | Age: 42
End: 2022-10-04

## 2022-10-04 DIAGNOSIS — E78.00 PURE HYPERCHOLESTEROLEMIA, UNSPECIFIED: ICD-10-CM

## 2022-10-04 DIAGNOSIS — Z00.8 ENCOUNTER FOR OTHER GENERAL EXAMINATION: ICD-10-CM

## 2022-10-04 DIAGNOSIS — I49.3 VENTRICULAR PREMATURE DEPOLARIZATION: ICD-10-CM

## 2022-10-04 PROCEDURE — 93306 TTE W/DOPPLER COMPLETE: CPT

## 2022-10-04 PROCEDURE — 93015 CV STRESS TEST SUPVJ I&R: CPT

## 2022-10-04 PROCEDURE — 75571 CT HRT W/O DYE W/CA TEST: CPT

## 2022-10-04 PROCEDURE — 75571 CT HRT W/O DYE W/CA TEST: CPT | Mod: 26

## 2022-10-21 PROCEDURE — 93224 XTRNL ECG REC UP TO 48 HRS: CPT

## 2022-10-22 ENCOUNTER — OUTPATIENT (OUTPATIENT)
Dept: OUTPATIENT SERVICES | Facility: HOSPITAL | Age: 42
LOS: 1 days | End: 2022-10-22
Payer: COMMERCIAL

## 2022-10-22 ENCOUNTER — APPOINTMENT (OUTPATIENT)
Dept: ULTRASOUND IMAGING | Facility: IMAGING CENTER | Age: 42
End: 2022-10-22

## 2022-10-22 DIAGNOSIS — Z00.8 ENCOUNTER FOR OTHER GENERAL EXAMINATION: ICD-10-CM

## 2022-10-22 PROCEDURE — 76700 US EXAM ABDOM COMPLETE: CPT

## 2022-10-22 PROCEDURE — 76700 US EXAM ABDOM COMPLETE: CPT | Mod: 26

## 2022-10-25 ENCOUNTER — NON-APPOINTMENT (OUTPATIENT)
Age: 42
End: 2022-10-25

## 2022-11-29 ENCOUNTER — EMERGENCY (EMERGENCY)
Facility: HOSPITAL | Age: 42
LOS: 1 days | Discharge: HOME | End: 2022-11-29
Attending: EMERGENCY MEDICINE | Admitting: EMERGENCY MEDICINE
Payer: COMMERCIAL

## 2022-11-29 VITALS
HEIGHT: 68 IN | WEIGHT: 175.05 LBS | HEART RATE: 96 BPM | OXYGEN SATURATION: 96 % | DIASTOLIC BLOOD PRESSURE: 57 MMHG | TEMPERATURE: 96 F | SYSTOLIC BLOOD PRESSURE: 133 MMHG | RESPIRATION RATE: 16 BRPM

## 2022-11-29 LAB
ALBUMIN SERPL ELPH-MCNC: 4.3 G/DL — SIGNIFICANT CHANGE UP (ref 3.3–5)
ALP SERPL-CCNC: 117 U/L — SIGNIFICANT CHANGE UP (ref 40–120)
ALT FLD-CCNC: 41 U/L — SIGNIFICANT CHANGE UP (ref 10–45)
ANION GAP SERPL CALC-SCNC: 10 MMOL/L — SIGNIFICANT CHANGE UP (ref 5–17)
AST SERPL-CCNC: 23 U/L — SIGNIFICANT CHANGE UP (ref 10–40)
BASOPHILS # BLD AUTO: 0.05 K/UL — SIGNIFICANT CHANGE UP (ref 0–0.2)
BASOPHILS NFR BLD AUTO: 0.6 % — SIGNIFICANT CHANGE UP (ref 0–2)
BILIRUB SERPL-MCNC: 0.5 MG/DL — SIGNIFICANT CHANGE UP (ref 0.2–1.2)
BUN SERPL-MCNC: 16 MG/DL — SIGNIFICANT CHANGE UP (ref 7–23)
CALCIUM SERPL-MCNC: 8.8 MG/DL — SIGNIFICANT CHANGE UP (ref 8.4–10.5)
CHLORIDE SERPL-SCNC: 105 MMOL/L — SIGNIFICANT CHANGE UP (ref 96–108)
CO2 SERPL-SCNC: 24 MMOL/L — SIGNIFICANT CHANGE UP (ref 22–31)
CREAT SERPL-MCNC: 0.98 MG/DL — SIGNIFICANT CHANGE UP (ref 0.5–1.3)
EGFR: 100 ML/MIN/1.73M2 — SIGNIFICANT CHANGE UP
EOSINOPHIL # BLD AUTO: 0.14 K/UL — SIGNIFICANT CHANGE UP (ref 0–0.5)
EOSINOPHIL NFR BLD AUTO: 1.6 % — SIGNIFICANT CHANGE UP (ref 0–6)
GLUCOSE SERPL-MCNC: 165 MG/DL — HIGH (ref 70–99)
HCT VFR BLD CALC: 47.2 % — SIGNIFICANT CHANGE UP (ref 39–50)
HGB BLD-MCNC: 16.4 G/DL — SIGNIFICANT CHANGE UP (ref 13–17)
IMM GRANULOCYTES NFR BLD AUTO: 0.1 % — SIGNIFICANT CHANGE UP (ref 0–0.9)
LIDOCAIN IGE QN: 124 U/L — SIGNIFICANT CHANGE UP (ref 73–393)
LYMPHOCYTES # BLD AUTO: 0.77 K/UL — LOW (ref 1–3.3)
LYMPHOCYTES # BLD AUTO: 8.6 % — LOW (ref 13–44)
MCHC RBC-ENTMCNC: 30.5 PG — SIGNIFICANT CHANGE UP (ref 27–34)
MCHC RBC-ENTMCNC: 34.7 GM/DL — SIGNIFICANT CHANGE UP (ref 32–36)
MCV RBC AUTO: 87.7 FL — SIGNIFICANT CHANGE UP (ref 80–100)
MONOCYTES # BLD AUTO: 0.4 K/UL — SIGNIFICANT CHANGE UP (ref 0–0.9)
MONOCYTES NFR BLD AUTO: 4.5 % — SIGNIFICANT CHANGE UP (ref 2–14)
NEUTROPHILS # BLD AUTO: 7.55 K/UL — HIGH (ref 1.8–7.4)
NEUTROPHILS NFR BLD AUTO: 84.6 % — HIGH (ref 43–77)
NRBC # BLD: 0 /100 WBCS — SIGNIFICANT CHANGE UP (ref 0–0)
PLATELET # BLD AUTO: 202 K/UL — SIGNIFICANT CHANGE UP (ref 150–400)
POTASSIUM SERPL-MCNC: 3.6 MMOL/L — SIGNIFICANT CHANGE UP (ref 3.5–5.3)
POTASSIUM SERPL-SCNC: 3.6 MMOL/L — SIGNIFICANT CHANGE UP (ref 3.5–5.3)
PROT SERPL-MCNC: 7.5 G/DL — SIGNIFICANT CHANGE UP (ref 6–8.3)
RBC # BLD: 5.38 M/UL — SIGNIFICANT CHANGE UP (ref 4.2–5.8)
RBC # FLD: 11.9 % — SIGNIFICANT CHANGE UP (ref 10.3–14.5)
SODIUM SERPL-SCNC: 139 MMOL/L — SIGNIFICANT CHANGE UP (ref 135–145)
WBC # BLD: 8.92 K/UL — SIGNIFICANT CHANGE UP (ref 3.8–10.5)
WBC # FLD AUTO: 8.92 K/UL — SIGNIFICANT CHANGE UP (ref 3.8–10.5)

## 2022-11-29 PROCEDURE — 83690 ASSAY OF LIPASE: CPT

## 2022-11-29 PROCEDURE — 96365 THER/PROPH/DIAG IV INF INIT: CPT

## 2022-11-29 PROCEDURE — 99284 EMERGENCY DEPT VISIT MOD MDM: CPT | Mod: 25

## 2022-11-29 PROCEDURE — 80053 COMPREHEN METABOLIC PANEL: CPT

## 2022-11-29 PROCEDURE — 36415 COLL VENOUS BLD VENIPUNCTURE: CPT

## 2022-11-29 PROCEDURE — 85025 COMPLETE CBC W/AUTO DIFF WBC: CPT

## 2022-11-29 PROCEDURE — 93010 ELECTROCARDIOGRAM REPORT: CPT

## 2022-11-29 PROCEDURE — 99285 EMERGENCY DEPT VISIT HI MDM: CPT

## 2022-11-29 PROCEDURE — 96361 HYDRATE IV INFUSION ADD-ON: CPT

## 2022-11-29 PROCEDURE — 93005 ELECTROCARDIOGRAM TRACING: CPT

## 2022-11-29 RX ORDER — FAMOTIDINE 10 MG/ML
20 INJECTION INTRAVENOUS ONCE
Refills: 0 | Status: COMPLETED | OUTPATIENT
Start: 2022-11-29 | End: 2022-11-29

## 2022-11-29 RX ORDER — SODIUM CHLORIDE 9 MG/ML
1000 INJECTION INTRAMUSCULAR; INTRAVENOUS; SUBCUTANEOUS ONCE
Refills: 0 | Status: COMPLETED | OUTPATIENT
Start: 2022-11-29 | End: 2022-11-29

## 2022-11-29 RX ORDER — LIDOCAINE 4 G/100G
10 CREAM TOPICAL ONCE
Refills: 0 | Status: COMPLETED | OUTPATIENT
Start: 2022-11-29 | End: 2022-11-29

## 2022-11-29 RX ORDER — OMEPRAZOLE 10 MG/1
1 CAPSULE, DELAYED RELEASE ORAL
Qty: 30 | Refills: 0
Start: 2022-11-29

## 2022-11-29 RX ADMIN — LIDOCAINE 10 MILLILITER(S): 4 CREAM TOPICAL at 21:34

## 2022-11-29 RX ADMIN — FAMOTIDINE 200 MILLIGRAM(S): 10 INJECTION INTRAVENOUS at 22:07

## 2022-11-29 RX ADMIN — FAMOTIDINE 20 MILLIGRAM(S): 10 INJECTION INTRAVENOUS at 22:37

## 2022-11-29 RX ADMIN — SODIUM CHLORIDE 1000 MILLILITER(S): 9 INJECTION INTRAMUSCULAR; INTRAVENOUS; SUBCUTANEOUS at 22:34

## 2022-11-29 RX ADMIN — SODIUM CHLORIDE 1000 MILLILITER(S): 9 INJECTION INTRAMUSCULAR; INTRAVENOUS; SUBCUTANEOUS at 21:34

## 2022-11-29 RX ADMIN — Medication 30 MILLILITER(S): at 21:34

## 2022-11-29 NOTE — ED PROVIDER NOTE - CLINICAL SUMMARY MEDICAL DECISION MAKING FREE TEXT BOX
41-year-old male with epigastric pain and nausea last few hours.  Mild epigastric tenderness to palpation.  Likely gastritis versus PUD versus esophagitis.  No lower abdominal pain or tenderness to suggest appendicitis.  We will check an EKG to screen for cardiac pathology, though less likely.  Check labs rule out pancreatitis cholecystitis.  Give Pepcid and Maalox lidocaine.  Reassess 41-year-old male with epigastric pain and nausea last few hours.  Mild epigastric tenderness to palpation.  Likely gastritis versus PUD versus esophagitis.  No lower abdominal pain or tenderness to suggest appendicitis.  We will check an EKG to screen for cardiac pathology, though less likely.  Check labs rule out pancreatitis cholecystitis.  Give Pepcid and Maalox lidocaine.  Reassess    2300: labs ekg ok. pt feels much better now. pain resolved. abd nontender. f/u GI. dc with rx PPI

## 2022-11-29 NOTE — ED ADULT NURSE NOTE - OBJECTIVE STATEMENT
Patient came from home with complaint of abdominal pain for the past three to four hours. Patient complaint of nausea however denies nay vomit, diarrhea or constipation. Denies any chest pain, SOB or difficult breathing. Patient states to have stomach problems since he was 16 years old. Patient took ibuprofen before however with minimal relief.

## 2022-11-29 NOTE — ED PROVIDER NOTE - NSFOLLOWUPINSTRUCTIONS_ED_ALL_ED_FT
- take omeprazole daily    - see gastroenterologist this week      Abdominal Pain    WHAT YOU NEED TO KNOW:    Abdominal pain can be dull, achy, or sharp. You may have pain in one area of your abdomen, or in your entire abdomen. Your pain may be caused by a condition such as constipation, food sensitivity or poisoning, infection, or a blockage. Abdominal pain can also be from a hernia, appendicitis, or an ulcer. Liver, gallbladder, or kidney conditions can also cause abdominal pain. The cause of your abdominal pain may be unknown.     DISCHARGE INSTRUCTIONS:    Return to the emergency department if:   •You have new chest pain or shortness of breath.      •You have pulsing pain in your upper abdomen or lower back that suddenly becomes constant.      •Your pain is in the right lower abdominal area and worsens with movement.       •You have a fever over 100.4°F (38°C) or shaking chills.       •You are vomiting and cannot keep food or liquids down.       •Your pain does not improve or gets worse over the next 8 to 12 hours.       •You see blood in your vomit or bowel movements, or they look black and tarry.       •Your skin or the whites of your eyes turn yellow.       •You are a woman and have a large amount of vaginal bleeding that is not your monthly period.       Contact your healthcare provider if:   •You have pain in your lower back.       •You are a man and have pain in your testicles.      •You have pain when you urinate.       •You have questions or concerns about your condition or care.      Follow up with your healthcare provider within 24 hours or as directed: Write down your questions so you remember to ask them during your visits.     Medicines:   •Medicines may be given to calm your stomach and prevent vomiting or to decrease pain. Ask how to take pain medicine safely.      •Take your medicine as directed. Contact your healthcare provider if you think your medicine is not helping or if you have side effects. Tell him of her if you are allergic to any medicine. Keep a list of the medicines, vitamins, and herbs you take. Include the amounts, and when and why you take them. Bring the list or the pill bottles to follow-up visits. Carry your medicine list with you in case of an emergency

## 2022-11-29 NOTE — ED PROVIDER NOTE - CARE PROVIDER_API CALL
Anselmo Alvarado)  Gastroenterology  [address], [address]  [place] [address]  Phone: [phone]  Fax: [phone]  Follow Up Time: 1-3 Days

## 2022-11-29 NOTE — ED PROVIDER NOTE - PATIENT PORTAL LINK FT
You can access the FollowMyHealth Patient Portal offered by [facility] by registering at the following website: [email or web address]. By joining [employer]’s FollowMyHealth portal, you will also be able to view your health information using other applications (apps) compatible with our system.

## 2022-11-29 NOTE — ED PROVIDER NOTE - OBJECTIVE STATEMENT
42-year-old male complaining of epigastric pain for the last 3 to 4 hours sharp moderate, radiating all over her belly.  Associated with mild nausea.  No vomiting no diarrhea no black or bloody stools.  No fever chills.  No chest pain shortness of breath.  States he had an endoscopy 8 months ago which patient reported was unremarkable.  Patient states he has had stomach pains/problems since he was 16 years old.Patient states his abdominal pains are worse with food.  No change with exertion.

## 2022-12-04 ENCOUNTER — TRANSCRIPTION ENCOUNTER (OUTPATIENT)
Age: 42
End: 2022-12-04

## 2022-12-04 RX ORDER — TENOFOVIR DISOPROXIL FUMARATE 300 MG/1
300 TABLET ORAL DAILY
Qty: 90 | Refills: 3 | Status: ACTIVE | COMMUNITY
Start: 2020-12-07 | End: 1900-01-01

## 2022-12-16 ENCOUNTER — APPOINTMENT (OUTPATIENT)
Dept: GASTROENTEROLOGY | Facility: CLINIC | Age: 42
End: 2022-12-16

## 2022-12-16 VITALS
SYSTOLIC BLOOD PRESSURE: 121 MMHG | OXYGEN SATURATION: 95 % | HEIGHT: 68 IN | HEART RATE: 72 BPM | WEIGHT: 175 LBS | DIASTOLIC BLOOD PRESSURE: 76 MMHG | TEMPERATURE: 98.2 F | BODY MASS INDEX: 26.52 KG/M2

## 2022-12-16 DIAGNOSIS — R07.0 PAIN IN THROAT: ICD-10-CM

## 2022-12-16 DIAGNOSIS — K27.9 PEPTIC ULCER, SITE UNSPECIFIED, UNSPECIFIED AS ACUTE OR CHRONIC, W/OUT HEMORRHAGE OR PERFORATION: ICD-10-CM

## 2022-12-16 DIAGNOSIS — Z20.822 ENCOUNTER FOR PREPROCEDURAL LABORATORY EXAMINATION: ICD-10-CM

## 2022-12-16 DIAGNOSIS — Z01.812 ENCOUNTER FOR PREPROCEDURAL LABORATORY EXAMINATION: ICD-10-CM

## 2022-12-16 DIAGNOSIS — M54.9 DORSALGIA, UNSPECIFIED: ICD-10-CM

## 2022-12-16 DIAGNOSIS — Z80.9 FAMILY HISTORY OF MALIGNANT NEOPLASM, UNSPECIFIED: ICD-10-CM

## 2022-12-16 PROCEDURE — 99204 OFFICE O/P NEW MOD 45 MIN: CPT

## 2022-12-16 NOTE — PHYSICAL EXAM
[Alert] : alert [Normal Voice/Communication] : normal voice/communication [Healthy Appearing] : healthy appearing [No Acute Distress] : no acute distress [Sclera] : the sclera and conjunctiva were normal [Hearing Threshold Finger Rub Not Dolores] : hearing was normal [Normal Lips/Gums] : the lips and gums were normal [Oropharynx] : the oropharynx was normal [Normal Appearance] : the appearance of the neck was normal [No Neck Mass] : no neck mass was observed [No Respiratory Distress] : no respiratory distress [No Acc Muscle Use] : no accessory muscle use [Respiration, Rhythm And Depth] : normal respiratory rhythm and effort [Auscultation Breath Sounds / Voice Sounds] : lungs were clear to auscultation bilaterally [Heart Rate And Rhythm] : heart rate was normal and rhythm regular [Normal S1, S2] : normal S1 and S2 [Murmurs] : no murmurs [None] : no edema [Bowel Sounds] : normal bowel sounds [Abdomen Tenderness] : non-tender [No Masses] : no abdominal mass palpated [Abdomen Soft] : soft [] : no hepatosplenomegaly [Cervical Lymph Nodes Enlarged Posterior Bilaterally] : no posterior cervical lymphadenopathy [Supraclavicular Lymph Nodes Enlarged Bilaterally] : no supraclavicular lymphadenopathy [No CVA Tenderness] : no CVA  tenderness [Abnormal Walk] : normal gait [Normal] : oriented to person, place, and time [Oriented To Time, Place, And Person] : oriented to person, place, and time

## 2022-12-16 NOTE — PHYSICAL EXAM
[Alert] : alert [Normal Voice/Communication] : normal voice/communication [Healthy Appearing] : healthy appearing [No Acute Distress] : no acute distress [Sclera] : the sclera and conjunctiva were normal [Hearing Threshold Finger Rub Not Garza] : hearing was normal [Normal Lips/Gums] : the lips and gums were normal [Oropharynx] : the oropharynx was normal [Normal Appearance] : the appearance of the neck was normal [No Neck Mass] : no neck mass was observed [No Respiratory Distress] : no respiratory distress [No Acc Muscle Use] : no accessory muscle use [Respiration, Rhythm And Depth] : normal respiratory rhythm and effort [Auscultation Breath Sounds / Voice Sounds] : lungs were clear to auscultation bilaterally [Heart Rate And Rhythm] : heart rate was normal and rhythm regular [Normal S1, S2] : normal S1 and S2 [Murmurs] : no murmurs [None] : no edema [Bowel Sounds] : normal bowel sounds [Abdomen Tenderness] : non-tender [No Masses] : no abdominal mass palpated [Abdomen Soft] : soft [] : no hepatosplenomegaly [Cervical Lymph Nodes Enlarged Posterior Bilaterally] : no posterior cervical lymphadenopathy [Supraclavicular Lymph Nodes Enlarged Bilaterally] : no supraclavicular lymphadenopathy [No CVA Tenderness] : no CVA  tenderness [Abnormal Walk] : normal gait [Normal] : oriented to person, place, and time [Oriented To Time, Place, And Person] : oriented to person, place, and time

## 2022-12-25 ENCOUNTER — TRANSCRIPTION ENCOUNTER (OUTPATIENT)
Age: 42
End: 2022-12-25

## 2022-12-25 LAB — SARS-COV-2 N GENE NPH QL NAA+PROBE: NOT DETECTED

## 2022-12-28 ENCOUNTER — APPOINTMENT (OUTPATIENT)
Dept: GASTROENTEROLOGY | Facility: AMBULATORY MEDICAL SERVICES | Age: 42
End: 2022-12-28

## 2022-12-28 PROCEDURE — 43239 EGD BIOPSY SINGLE/MULTIPLE: CPT

## 2023-01-10 ENCOUNTER — APPOINTMENT (OUTPATIENT)
Dept: GASTROENTEROLOGY | Facility: CLINIC | Age: 43
End: 2023-01-10
Payer: COMMERCIAL

## 2023-01-10 VITALS
HEART RATE: 71 BPM | DIASTOLIC BLOOD PRESSURE: 71 MMHG | SYSTOLIC BLOOD PRESSURE: 120 MMHG | HEIGHT: 68 IN | OXYGEN SATURATION: 97 % | WEIGHT: 175 LBS | BODY MASS INDEX: 26.52 KG/M2 | TEMPERATURE: 97.7 F

## 2023-01-10 DIAGNOSIS — R10.13 EPIGASTRIC PAIN: ICD-10-CM

## 2023-01-10 PROCEDURE — 99214 OFFICE O/P EST MOD 30 MIN: CPT

## 2023-01-10 RX ORDER — ERGOCALCIFEROL 1.25 MG/1
1.25 MG CAPSULE, LIQUID FILLED ORAL
Qty: 12 | Refills: 3 | Status: DISCONTINUED | COMMUNITY
Start: 2020-11-28 | End: 2023-01-10

## 2023-01-10 RX ORDER — OMEPRAZOLE 40 MG/1
40 CAPSULE, DELAYED RELEASE ORAL
Qty: 1 | Refills: 3 | Status: DISCONTINUED | COMMUNITY
Start: 2022-12-16 | End: 2023-01-10

## 2023-01-10 RX ORDER — FAMOTIDINE 20 MG/1
20 TABLET, FILM COATED ORAL TWICE DAILY
Qty: 60 | Refills: 5 | Status: ACTIVE | COMMUNITY
Start: 2023-01-10 | End: 1900-01-01

## 2023-01-10 NOTE — HISTORY OF PRESENT ILLNESS
[FreeTextEntry1] : 42-year-old man with chronic hepatitis B.  He has history of chronic peptic ulcer disease initially diagnosed at age 16 on EGD.  He has had subsequent endoscopies with his most recent one being done elsewhere on March 21, 2022.  At that time he had acute gastritis and he has been treated with omeprazole 40 mg daily.  He has been taking omeprazole intermittently.  Recently he has noticed recurrence of his epigastric pain however this time it appears to be different.  The pain radiates to his back and does not seem to be responding to the omeprazole.  He is now taking the omeprazole daily.  He had an abdominal sonogram that showed a 6 mm gallbladder polyp.  He denies nausea or vomiting.  He was told by his PCP that his pain is most likely muscular and has been placed on ibuprofen.  He denies rectal bleeding, melena or hematemesis.  He had a colonoscopy approximately a year ago because of a family history of colon cancer that according to the patient was unremarkable.

## 2023-01-10 NOTE — REASON FOR VISIT
[Consultation] : a consultation visit [FreeTextEntry1] : epigastric pain [Follow-up] : a follow-up of an existing diagnosis

## 2023-01-10 NOTE — ASSESSMENT
[FreeTextEntry1] : Chronic peptic ulcer disease rule out recurrence. Dietary and lifestyle modification discussed with the patient.  Patient is directed to remain on the omeprazole 40 mg daily.  EGD and biopsy to be scheduled.  This was discussed with the patient.  He understands and all questions were answered.\par Chronic Hepatitis B on maintenance therapy.  Patient is referred to the liver center.

## 2023-01-12 NOTE — ASSESSMENT
[FreeTextEntry1] : Acid reflux and epigastric pain. Dietary and lifestyle modification discussed with the patient.\par

## 2023-01-12 NOTE — HISTORY OF PRESENT ILLNESS
[FreeTextEntry1] : 42-year-old man with chronic hepatitis B and epigastric pain.  His epigastric pain is better when he has something to eat at night.  He has been on omeprazole 40 mg a day but he does not feel that it is giving him any relief.  He denies rectal bleeding, melena or hematemesis.  He underwent an EGD and biopsy on December 28 that was negative.  Abdominal sonogram previously showed a small GB polyp.

## 2023-03-06 ENCOUNTER — APPOINTMENT (OUTPATIENT)
Dept: CARDIOLOGY | Facility: CLINIC | Age: 43
End: 2023-03-06

## 2023-03-14 ENCOUNTER — APPOINTMENT (OUTPATIENT)
Dept: GASTROENTEROLOGY | Facility: CLINIC | Age: 43
End: 2023-03-14
Payer: COMMERCIAL

## 2023-03-14 VITALS
WEIGHT: 175 LBS | BODY MASS INDEX: 26.52 KG/M2 | OXYGEN SATURATION: 97 % | SYSTOLIC BLOOD PRESSURE: 126 MMHG | DIASTOLIC BLOOD PRESSURE: 70 MMHG | TEMPERATURE: 97.7 F | HEART RATE: 68 BPM | HEIGHT: 68 IN

## 2023-03-14 DIAGNOSIS — R68.81 EARLY SATIETY: ICD-10-CM

## 2023-03-14 PROCEDURE — 99214 OFFICE O/P EST MOD 30 MIN: CPT

## 2023-03-14 RX ORDER — FAMOTIDINE 20 MG/1
20 TABLET, FILM COATED ORAL
Qty: 60 | Refills: 3 | Status: DISCONTINUED | COMMUNITY
Start: 2023-01-10 | End: 2023-03-14

## 2023-03-14 NOTE — PHYSICAL EXAM
[Normal] : normal bowel sounds, non-tender, no masses, soft, no no hepato-splenomegaly [None] : no edema [Cervical Lymph Nodes Enlarged Posterior Bilaterally] : no posterior cervical lymphadenopathy [Supraclavicular Lymph Nodes Enlarged Bilaterally] : no supraclavicular lymphadenopathy [Abnormal Walk] : normal gait [Motor Exam] : the motor exam was normal

## 2023-03-14 NOTE — ASSESSMENT
[FreeTextEntry1] : Acid reflux with epigastric pain and early satiety. Dietary and lifestyle modification discussed with the patient.  Continue famotidine.  We will get CT of the abdomen and pelvis.  This was discussed with the patient.  He understands and all questions were answered.\par

## 2023-03-14 NOTE — HISTORY OF PRESENT ILLNESS
[FreeTextEntry1] : 42-year-old man with GERD and atypical chest pain.  EGD and biopsy were unremarkable.  Abdominal sonogram showed gallbladder polyp.  Although he is feeling better with the famotidine he still complains of early satiety, bloating and discomfort on an empty stomach.  He denies rectal bleeding, melena or hematemesis.

## 2023-03-18 ENCOUNTER — RESULT REVIEW (OUTPATIENT)
Age: 43
End: 2023-03-18

## 2023-03-18 LAB — HEMOCCULT STL QL IA: NEGATIVE

## 2023-03-24 ENCOUNTER — OUTPATIENT (OUTPATIENT)
Dept: OUTPATIENT SERVICES | Facility: HOSPITAL | Age: 43
LOS: 1 days | End: 2023-03-24
Payer: COMMERCIAL

## 2023-03-24 ENCOUNTER — APPOINTMENT (OUTPATIENT)
Dept: CT IMAGING | Facility: IMAGING CENTER | Age: 43
End: 2023-03-24
Payer: COMMERCIAL

## 2023-03-24 DIAGNOSIS — R10.13 EPIGASTRIC PAIN: ICD-10-CM

## 2023-03-24 DIAGNOSIS — K82.4 CHOLESTEROLOSIS OF GALLBLADDER: ICD-10-CM

## 2023-03-24 DIAGNOSIS — R68.81 EARLY SATIETY: ICD-10-CM

## 2023-03-24 DIAGNOSIS — B18.1 CHRONIC VIRAL HEPATITIS B WITHOUT DELTA-AGENT: ICD-10-CM

## 2023-03-24 PROCEDURE — 74177 CT ABD & PELVIS W/CONTRAST: CPT | Mod: 26

## 2023-03-24 PROCEDURE — 74177 CT ABD & PELVIS W/CONTRAST: CPT

## 2023-03-28 ENCOUNTER — NON-APPOINTMENT (OUTPATIENT)
Age: 43
End: 2023-03-28

## 2023-04-18 ENCOUNTER — LABORATORY RESULT (OUTPATIENT)
Age: 43
End: 2023-04-18

## 2023-04-18 ENCOUNTER — APPOINTMENT (OUTPATIENT)
Dept: PULMONOLOGY | Facility: CLINIC | Age: 43
End: 2023-04-18
Payer: COMMERCIAL

## 2023-04-18 ENCOUNTER — NON-APPOINTMENT (OUTPATIENT)
Age: 43
End: 2023-04-18

## 2023-04-18 VITALS
SYSTOLIC BLOOD PRESSURE: 120 MMHG | BODY MASS INDEX: 26.52 KG/M2 | HEART RATE: 58 BPM | HEIGHT: 68 IN | DIASTOLIC BLOOD PRESSURE: 71 MMHG | WEIGHT: 175 LBS | OXYGEN SATURATION: 97 %

## 2023-04-18 DIAGNOSIS — R07.89 OTHER CHEST PAIN: ICD-10-CM

## 2023-04-18 DIAGNOSIS — R31.29 OTHER MICROSCOPIC HEMATURIA: ICD-10-CM

## 2023-04-18 DIAGNOSIS — R00.2 PALPITATIONS: ICD-10-CM

## 2023-04-18 PROCEDURE — 36415 COLL VENOUS BLD VENIPUNCTURE: CPT

## 2023-04-18 PROCEDURE — 99396 PREV VISIT EST AGE 40-64: CPT | Mod: 25

## 2023-04-18 PROCEDURE — 81003 URINALYSIS AUTO W/O SCOPE: CPT | Mod: QW

## 2023-04-18 PROCEDURE — 82044 UR ALBUMIN SEMIQUANTITATIVE: CPT | Mod: QW

## 2023-04-18 PROCEDURE — 93000 ELECTROCARDIOGRAM COMPLETE: CPT

## 2023-04-18 PROCEDURE — 83036 HEMOGLOBIN GLYCOSYLATED A1C: CPT | Mod: QW

## 2023-04-21 NOTE — PLAN
[FreeTextEntry1] : Reviewed Abdomen and Pelvis CT done on 03/24/2023 with patient today. \par Age appropriate preventative care counseling discussed with patient.\par Advised patient that there is no need for a procedure for his gallstones at this time until he becomes symptomatic.\par Advised patient to receive annual right upper quadrant abdominal ultrasound follow-up for stable gallbladder polyp.\par Venipuncture with labs drawn in office.\par Obtained and reviewed EKG, Urinalysis, A1C with patient today.\par Prescribed Flomax for mild BPH.\par Return for follow up in 1 month.

## 2023-04-21 NOTE — DATA REVIEWED
[FreeTextEntry1] : \par  CT Abdomen and Pelvis w/ Oral Cont and w/ IV Cont             Final\par \par No Documents Attached\par \par \par \par Changed By [facility] - Reason: RADIOLOGIST ORDERED \par \par \par \par   EXAM: [id number] - CT ABDOMEN AND PELVIS OC IC  - ORDERED BY: REMY CLARK\par \par \par PROCEDURE DATE:  03/24/2023\par \par \par \par INTERPRETATION:  CLINICAL INFORMATION: Abdominal pain. Evaluate\par \par COMPARISON: Chest CT scan 10/4/2022 and 8/10/2017\par \par CONTRAST/COMPLICATIONS:\par IV Contrast: Omnipaque 350  90 cc administered   10 cc discarded\par Oral Contrast: Omnipaque 300\par Complications: None reported at time of study completion\par \par PROCEDURE:\par CT of the Abdomen and Pelvis was performed.\par Sagittal and coronal reformats were performed.\par \par FINDINGS:\par LOWER CHEST: Triangular right lower lobe pulmonary nodule measures 5 mm (2:13), unchanged since 8/10/2017, likely intrapulmonary lymph node.\par \par LIVER: Within normal limits.\par BILE DUCTS: Normal caliber.\par GALLBLADDER: Cholelithiasis with punctate calcified stone in the neck of the gallbladder.\par SPLEEN: Within normal limits.\par PANCREAS: Within normal limits.\par ADRENALS: Within normal limits.\par KIDNEYS/URETERS: Within normal limits.\par \par BLADDER: Within normal limits.\par REPRODUCTIVE ORGANS: Mildly enlarged prostate.\par \par BOWEL: No bowel obstruction. Appendix is normal. Mild diverticulosis, without acute diverticulitis.\par PERITONEUM: No ascites.\par VESSELS: Within normal limits.\par RETROPERITONEUM/LYMPH NODES: No lymphadenopathy.\par ABDOMINAL WALL: Within normal limits.\par BONES: Within normal limits.\par \par IMPRESSION:\par *  Cholelithiasis with small calcified gallstone in the neck of the gallbladder.\par *  No evidence of suspicious mass or adenopathy in the abdomen and pelvis.\par \par \par \par --- End of Report ---\par \par \par \par \par \par \par JIHAN WHITE MD; Attending Radiologist\par This document has been electronically signed. Mar 27 2023  8:25PM\par \par  \par \par  Ordered by: REMY CLARK       Collected/Examined: 24Mar2023 05:58PM       \par Verified by: REMY CLARK 28Mar2023 12:05PM       \par  Result Communication: No patient communication needed at this time;\par Stage: Final       \par  Performed at: [facility] the [facility]       Resulted: 27Mar2023 08:20PM       Last Updated: 28Mar2023 12:05PM       Accession: [id number]       \par ___\par \par  POCT - Hemoglobin A1C             Final\par \par No Documents Attached\par \par \par   Test   Result   Flag Reference Goal Last Verified \par   POCT Hemoglobin A1C 5.2   Normal: 4.0 - 5.6  REQUIRED \par \par  Ordered by: MINOO LUNDBERG       Collected/Examined: 18Apr2023 10:04AM       \par Verification Required       Stage: Final       \par  Performed at: In Office       Performed by: MINOO LUNDBERG       Resulted: [id number] 10:04AM       Last Updated: 18Apr2023 10:05AM       \par ___\par \par  POCT - MicroAlbumin             Final\par \par No Documents Attached\par \par \par   Test   Result   Flag Reference Goal Last Verified \par   Albumin 10 MG      REQUIRED \par   Albumin to Creatinine Ratio <30 MG      REQUIRED \par   Creatinine 100MG      REQUIRED \par \par  Ordered by: MINOO LUNDBERG       Collected/Examined: [id number] 10:00AM       \par Verification Required       Stage: Final       \par  Performed at: In Office       Performed by: MINOO LUNDBERG       Resulted: [id number] 10:00AM       Last Updated: [id number] 10:01AM       \par ___\par \par  POCT - A Urinalysis Dip (Automated)             Final\par \par No Documents Attached\par \par \par   Test   Result   Flag Reference Goal Last Verified \par   Glucose NEG      REQUIRED \par   Bilirubin NEG      REQUIRED \par   Ketone NEG      REQUIRED \par   Specific Gravity 1.010      REQUIRED \par   Blood TRACE-INTACT      REQUIRED \par   pH 7.0      REQUIRED \par   Protein NEG      REQUIRED \par   Urobilinogen 0.2 EU/dl      REQUIRED \par   Nitrite NEG      REQUIRED \par   Leukocytes NEG      REQUIRED \par   Clarity Clear      REQUIRED \par   Collection Method Void      REQUIRED \par \par  Ordered by: MINOO LUNBDERG       Collected/Examined: 18Apr2023 09:57AM       \par Verification Required       Stage: Final       \par  Performed at: In Office       Performed by: MINOO LUNDBERG       Resulted: [id number] 09:57AM       Last Updated: [id number] 10:00AM       \par ________\par \par EKG shows sinus bradycardia at 56 bpm, left atrial enlargement, age undetermined anterior infarct.

## 2023-04-21 NOTE — HISTORY OF PRESENT ILLNESS
[FreeTextEntry1] : Annual wellness visit.  [de-identified] : MIREILLE VALDEZ is a 42 year old male, with history of chronic hepatitis B, Glucose intolerance, Vitamin D deficiency, acid reflux, gallbladder polyp, anemia, who presents to the office for an annual wellness visit. Patient reports of having abdominal pain 2 weeks ago which has resolved. He states that he followed up with Dr. Lefty Pleitez, GI, for his symptoms to receive a work up which indicated gallstones. Patient reports that he is asymptomatic from gallstones and does not feel any pain in that area. He also states that he received an EGD which was unremarkable. Patient denies of having any bowel movement or urination problems, abdominal pain, chest pain.

## 2023-04-21 NOTE — ADDENDUM
[FreeTextEntry1] : I, Antony Diallorodolfo, acted solely as a scribe for Dr. Yashira Chen D.O., on this date 04/18/2023. \par \par All medical record entries made by the Scribe were at my, Dr. Yashira Chen D.O., direction and personally dictated by me on 04/18/2023. I have reviewed the chart and agree that the record accurately reflects my personal performance of the history, physical exam, assessment and plan. I have also personally directed, reviewed, and agreed with the chart.

## 2023-04-22 LAB
25(OH)D3 SERPL-MCNC: 22.2 NG/ML
ALBUMIN SERPL ELPH-MCNC: 4.8 G/DL
ALBUMIN: NORMAL
ALP BLD-CCNC: 113 U/L
ALT SERPL-CCNC: 46 U/L
AMYLASE/CREAT SERPL: 56 U/L
ANION GAP SERPL CALC-SCNC: 15 MMOL/L
APPEARANCE: CLEAR
AST SERPL-CCNC: 25 U/L
BACTERIA UR CULT: NORMAL
BACTERIA: NEGATIVE
BASOPHILS # BLD AUTO: 0.05 K/UL
BASOPHILS NFR BLD AUTO: 0.8 %
BILIRUB SERPL-MCNC: 0.9 MG/DL
BILIRUB UR QL STRIP: NORMAL
BILIRUBIN URINE: NEGATIVE
BLOOD URINE: NEGATIVE
BUN SERPL-MCNC: 14 MG/DL
CALCIUM SERPL-MCNC: 9.1 MG/DL
CHLORIDE SERPL-SCNC: 103 MMOL/L
CHOLEST SERPL-MCNC: 165 MG/DL
CLARITY UR: CLEAR
CO2 SERPL-SCNC: 21 MMOL/L
COLLECTION METHOD: NORMAL
COLOR: COLORLESS
CREAT SERPL-MCNC: 0.69 MG/DL
CREAT SPEC-SCNC: 47 MG/DL
CREATININE: NORMAL
EGFR: 119 ML/MIN/1.73M2
EOSINOPHIL # BLD AUTO: 0.06 K/UL
EOSINOPHIL NFR BLD AUTO: 0.9 %
GGT SERPL-CCNC: 42 U/L
GLUCOSE QUALITATIVE U: NEGATIVE
GLUCOSE SERPL-MCNC: 87 MG/DL
GLUCOSE UR-MCNC: NORMAL
HBA1C MFR BLD HPLC: 5.2
HBV E AG SER QL: NONREACTIVE
HBV SURFACE AB SER QL: NONREACTIVE
HCG UR QL: 0.2 EU/DL
HCT VFR BLD CALC: 51.1 %
HCV AB SER QL: NONREACTIVE
HCV S/CO RATIO: 0.08 S/CO
HDLC SERPL-MCNC: 58 MG/DL
HEPB DNA PCR INT: NOT DETECTED
HEPB DNA PCR LOG: NOT DETECTED LOGIU/ML
HGB BLD-MCNC: 16.8 G/DL
HGB UR QL STRIP.AUTO: NORMAL
HYALINE CASTS: 0 /LPF
IMM GRANULOCYTES NFR BLD AUTO: 0.2 %
KETONES UR-MCNC: NORMAL
KETONES URINE: NEGATIVE
LDLC SERPL CALC-MCNC: 97 MG/DL
LEUKOCYTE ESTERASE UR QL STRIP: NORMAL
LEUKOCYTE ESTERASE URINE: NEGATIVE
LYMPHOCYTES # BLD AUTO: 0.88 K/UL
LYMPHOCYTES NFR BLD AUTO: 13.6 %
MAGNESIUM SERPL-MCNC: 2.3 MG/DL
MAN DIFF?: NORMAL
MCHC RBC-ENTMCNC: 30 PG
MCHC RBC-ENTMCNC: 32.9 GM/DL
MCV RBC AUTO: 91.3 FL
MICROALBUMIN 24H UR DL<=1MG/L-MCNC: <1.2 MG/DL
MICROALBUMIN/CREAT 24H UR-RTO: NORMAL MG/G
MICROALBUMIN/CREAT UR TEST STR-RTO: NORMAL
MICROSCOPIC-UA: NORMAL
MONOCYTES # BLD AUTO: 0.53 K/UL
MONOCYTES NFR BLD AUTO: 8.2 %
NEUTROPHILS # BLD AUTO: 4.93 K/UL
NEUTROPHILS NFR BLD AUTO: 76.3 %
NITRITE UR QL STRIP: NORMAL
NITRITE URINE: NEGATIVE
NONHDLC SERPL-MCNC: 107 MG/DL
PH UR STRIP: 7
PH URINE: 7
PHOSPHATE SERPL-MCNC: 3.6 MG/DL
PLATELET # BLD AUTO: 223 K/UL
POTASSIUM SERPL-SCNC: 4.1 MMOL/L
PROT SERPL-MCNC: 7.1 G/DL
PROT UR STRIP-MCNC: NORMAL
PROTEIN URINE: NEGATIVE
PSA SERPL-MCNC: 1.48 NG/ML
RBC # BLD: 5.6 M/UL
RBC # FLD: 12.8 %
RED BLOOD CELLS URINE: 0 /HPF
SODIUM SERPL-SCNC: 140 MMOL/L
SP GR UR STRIP: 1.01
SPECIFIC GRAVITY URINE: 1.01
SQUAMOUS EPITHELIAL CELLS: 0 /HPF
T3 SERPL-MCNC: 116 NG/DL
T3RU NFR SERPL: 0.6 TBI
T4 FREE SERPL-MCNC: 1.5 NG/DL
T4 SERPL-MCNC: 8.1 UG/DL
TRIGL SERPL-MCNC: 53 MG/DL
TSH SERPL-ACNC: 1.3 UIU/ML
UROBILINOGEN URINE: NORMAL
WBC # FLD AUTO: 6.46 K/UL
WHITE BLOOD CELLS URINE: 0 /HPF

## 2023-04-22 RX ORDER — ERGOCALCIFEROL 1.25 MG/1
1.25 MG CAPSULE, LIQUID FILLED ORAL
Qty: 12 | Refills: 3 | Status: ACTIVE | COMMUNITY
Start: 2023-04-22 | End: 1900-01-01

## 2023-04-24 ENCOUNTER — APPOINTMENT (OUTPATIENT)
Dept: SURGERY | Facility: CLINIC | Age: 43
End: 2023-04-24

## 2023-04-27 ENCOUNTER — OUTPATIENT (OUTPATIENT)
Dept: OUTPATIENT SERVICES | Facility: HOSPITAL | Age: 43
LOS: 1 days | End: 2023-04-27
Payer: COMMERCIAL

## 2023-04-27 ENCOUNTER — APPOINTMENT (OUTPATIENT)
Dept: ULTRASOUND IMAGING | Facility: CLINIC | Age: 43
End: 2023-04-27
Payer: COMMERCIAL

## 2023-04-27 DIAGNOSIS — K82.4 CHOLESTEROLOSIS OF GALLBLADDER: ICD-10-CM

## 2023-04-27 DIAGNOSIS — Z00.8 ENCOUNTER FOR OTHER GENERAL EXAMINATION: ICD-10-CM

## 2023-04-27 PROCEDURE — 76705 ECHO EXAM OF ABDOMEN: CPT

## 2023-04-27 PROCEDURE — 76705 ECHO EXAM OF ABDOMEN: CPT | Mod: 26

## 2023-05-16 ENCOUNTER — APPOINTMENT (OUTPATIENT)
Dept: PULMONOLOGY | Facility: CLINIC | Age: 43
End: 2023-05-16
Payer: COMMERCIAL

## 2023-05-16 VITALS
DIASTOLIC BLOOD PRESSURE: 72 MMHG | RESPIRATION RATE: 16 BRPM | HEART RATE: 74 BPM | OXYGEN SATURATION: 96 % | SYSTOLIC BLOOD PRESSURE: 108 MMHG

## 2023-05-16 PROCEDURE — 99214 OFFICE O/P EST MOD 30 MIN: CPT

## 2023-05-17 NOTE — ASSESSMENT
[FreeTextEntry1] : Mr. MIREILLE VALDEZ is an 42 year old male, history of chronic hepatitis B, Glucose intolerance, Vitamin D deficiency, acid reflux, stable gallbladder polyp, mild BPH, anemia. Patient appears well overall.

## 2023-05-17 NOTE — PLAN
[FreeTextEntry1] : Referred patient to Dr. Brennen Martins, for urology consult for mild BPH shown on abdominal/pelvic CT scan.\par Continue Flomax for mild BPH.\par GI follow-up regarding gallbladder polyp.  \par Continue ergocalciferol for vitamin D deficiency.  \par Medications reviewed. Continue present medications. \par Return for follow up in 3 months.

## 2023-05-17 NOTE — HISTORY OF PRESENT ILLNESS
[FreeTextEntry1] : Follow up. [de-identified] : Mr. MIREILLE VALDEZ is an 42 year old male, history of chronic hepatitis B, Glucose intolerance, Vitamin D deficiency, acid reflux, gallbladder polyp, anemia, who presents to the office for follow up evaluation. Patient reports that he is feeling well overall with no respiratory complaints. He states of having to wake up in the middle of the night one time to urinate. Patient reports that he continues to take Flomax for his BPH.

## 2023-05-17 NOTE — ADDENDUM
[FreeTextEntry1] : I, Antony Diallorodolfo, acted solely as a scribe for Dr. Yashira Chen D.O., on this date 05/16/2023. \par \par All medical record entries made by the Scribe were at my, Dr. Yashira Chen D.O., direction and personally dictated by me on 05/16/2023. I have reviewed the chart and agree that the record accurately reflects my personal performance of the history, physical exam, assessment and plan. I have also personally directed, reviewed, and agreed with the chart.

## 2023-05-31 ENCOUNTER — APPOINTMENT (OUTPATIENT)
Dept: PULMONOLOGY | Facility: CLINIC | Age: 43
End: 2023-05-31
Payer: COMMERCIAL

## 2023-05-31 VITALS
OXYGEN SATURATION: 97 % | DIASTOLIC BLOOD PRESSURE: 70 MMHG | HEART RATE: 72 BPM | RESPIRATION RATE: 16 BRPM | SYSTOLIC BLOOD PRESSURE: 111 MMHG

## 2023-05-31 DIAGNOSIS — L23.7 ALLERGIC CONTACT DERMATITIS DUE TO PLANTS, EXCEPT FOOD: ICD-10-CM

## 2023-05-31 DIAGNOSIS — R21 RASH AND OTHER NONSPECIFIC SKIN ERUPTION: ICD-10-CM

## 2023-05-31 PROCEDURE — 99214 OFFICE O/P EST MOD 30 MIN: CPT

## 2023-06-01 PROBLEM — R21 SKIN RASH: Status: ACTIVE | Noted: 2023-06-01

## 2023-06-01 NOTE — PLAN
[FreeTextEntry1] : Prescribed Mometasone cream and started patient on a 9-day Prednisone taper for arm rash.\par Continue Flomax for mild BPH.\par Continue ergocalciferol for vitamin D deficiency. \par Return for follow up in 3 months.

## 2023-06-01 NOTE — PHYSICAL EXAM
[No Acute Distress] : no acute distress [Well Nourished] : well nourished [Well Developed] : well developed [Well-Appearing] : well-appearing [Normal Sclera/Conjunctiva] : normal sclera/conjunctiva [PERRL] : pupils equal round and reactive to light [EOMI] : extraocular movements intact [Normal Outer Ear/Nose] : the outer ears and nose were normal in appearance [Normal Oropharynx] : the oropharynx was normal [No JVD] : no jugular venous distention [No Lymphadenopathy] : no lymphadenopathy [Supple] : supple [Thyroid Normal, No Nodules] : the thyroid was normal and there were no nodules present [No Respiratory Distress] : no respiratory distress  [No Accessory Muscle Use] : no accessory muscle use [Clear to Auscultation] : lungs were clear to auscultation bilaterally [Normal Rate] : normal rate  [Regular Rhythm] : with a regular rhythm [Normal S1, S2] : normal S1 and S2 [No Murmur] : no murmur heard [No Carotid Bruits] : no carotid bruits [No Abdominal Bruit] : a ~M bruit was not heard ~T in the abdomen [No Varicosities] : no varicosities [Pedal Pulses Present] : the pedal pulses are present [No Edema] : there was no peripheral edema [No Palpable Aorta] : no palpable aorta [No Extremity Clubbing/Cyanosis] : no extremity clubbing/cyanosis [Soft] : abdomen soft [Non Tender] : non-tender [Non-distended] : non-distended [No Masses] : no abdominal mass palpated [No HSM] : no HSM [Normal Bowel Sounds] : normal bowel sounds [Normal Posterior Cervical Nodes] : no posterior cervical lymphadenopathy [Normal Anterior Cervical Nodes] : no anterior cervical lymphadenopathy [No CVA Tenderness] : no CVA  tenderness [No Spinal Tenderness] : no spinal tenderness [No Joint Swelling] : no joint swelling [Grossly Normal Strength/Tone] : grossly normal strength/tone [Coordination Grossly Intact] : coordination grossly intact [No Focal Deficits] : no focal deficits [Normal Gait] : normal gait [Deep Tendon Reflexes (DTR)] : deep tendon reflexes were 2+ and symmetric [Normal Affect] : the affect was normal [Normal Insight/Judgement] : insight and judgment were intact [de-identified] : Rash on bilateral arms

## 2023-06-01 NOTE — HISTORY OF PRESENT ILLNESS
[FreeTextEntry1] : Acute itchy bumps on arms. [de-identified] : MIREILLE VALDEZ is a 42 year old male, with history of chronic hepatitis B, Glucose intolerance, Vitamin D deficiency, acid reflux, gallbladder polyp, anemia, who presents to the office for an evaluation of acute rash on arms. Patient reports of having acute itchy bumps that started 1 week ago.

## 2023-06-01 NOTE — ASSESSMENT
[FreeTextEntry1] : Mr. MIREILLE VALDEZ is an 42 year old male, history of chronic hepatitis B, Glucose intolerance, Vitamin D deficiency, acid reflux, gallbladder polyp, anemia. Patient has an acute rash on bilateral arms likely secondary to contact with poison ivy.

## 2023-06-01 NOTE — ADDENDUM
[FreeTextEntry1] : I, Antony Diallorodolfo, acted solely as a scribe for Dr. Yashira Chen D.O., on this date 05/31/2023. \par \par All medical record entries made by the Scribe were at my, Dr. Yashira Chen D.O., direction and personally dictated by me on 05/31/2023. I have reviewed the chart and agree that the record accurately reflects my personal performance of the history, physical exam, assessment and plan. I have also personally directed, reviewed, and agreed with the chart.

## 2023-06-08 ENCOUNTER — APPOINTMENT (OUTPATIENT)
Dept: UROLOGY | Facility: CLINIC | Age: 43
End: 2023-06-08
Payer: COMMERCIAL

## 2023-06-08 VITALS
SYSTOLIC BLOOD PRESSURE: 112 MMHG | HEART RATE: 70 BPM | BODY MASS INDEX: 27.28 KG/M2 | WEIGHT: 180 LBS | DIASTOLIC BLOOD PRESSURE: 71 MMHG | HEIGHT: 68 IN | RESPIRATION RATE: 16 BRPM

## 2023-06-08 DIAGNOSIS — R35.1 NOCTURIA: ICD-10-CM

## 2023-06-08 DIAGNOSIS — N40.0 BENIGN PROSTATIC HYPERPLASIA WITHOUT LOWER URINARY TRACT SYMPMS: ICD-10-CM

## 2023-06-08 DIAGNOSIS — N40.1 BENIGN PROSTATIC HYPERPLASIA WITH LOWER URINARY TRACT SYMPMS: ICD-10-CM

## 2023-06-08 DIAGNOSIS — N41.9 INFLAMMATORY DISEASE OF PROSTATE, UNSPECIFIED: ICD-10-CM

## 2023-06-08 DIAGNOSIS — N13.8 BENIGN PROSTATIC HYPERPLASIA WITH LOWER URINARY TRACT SYMPMS: ICD-10-CM

## 2023-06-08 PROCEDURE — 99204 OFFICE O/P NEW MOD 45 MIN: CPT

## 2023-06-08 RX ORDER — TAMSULOSIN HYDROCHLORIDE 0.4 MG/1
0.4 CAPSULE ORAL
Qty: 90 | Refills: 3 | Status: DISCONTINUED | COMMUNITY
Start: 2023-04-18 | End: 2023-06-08

## 2023-06-08 RX ORDER — DOXYCYCLINE HYCLATE 100 MG/1
100 TABLET ORAL
Qty: 28 | Refills: 0 | Status: ACTIVE | COMMUNITY
Start: 2023-06-08 | End: 1900-01-01

## 2023-06-09 NOTE — ASSESSMENT
[FreeTextEntry1] : At the age of 42 it would be possible but unlikely he would be experiencing obstruction from his prostate.  I think more likely he may be experiencing prostate inflammation especially given the recent timeframe of his symptoms.  I recommended that we use doxycycline 100 mg twice daily for the next 2 weeks to see if that will help mitigate some of his symptoms.  In addition to the urinary symptoms he describes some discomfort at the tip of the penis which is also potentially consistent with prostatitis.  If this intervention is not successful, we could potentially consider anticholinergic medication to manage the frequency but I would rather not put him on a long-term medication for the symptoms which are relatively recent in their onset and may resolve.\par \par We reviewed the PSA levels done for him.  They are both mildly elevated for his age.  While I do not think they are high enough to warrant any work-up at this time for prostate cancer, I do think they warrant annual follow-up for screening purposes for prostate cancer.  I would suggest he keep to a timeframe of annual PSA levels for now.

## 2023-06-09 NOTE — HISTORY OF PRESENT ILLNESS
[FreeTextEntry1] : MIREILLE VALDEZ presents to the office today. He is a 42 year old man who is here with lower urinary tracts symptoms for the last two months. He wakes up twice at night. He has a strong stream and feels that he is completely emptying his bladder. Occasionally he has hesitancy.  He was started on Tamsulosin 0.4mg, he said after one month it did not work so he stopped it. He had his PSA drawn in April and it was 1.48ng/mL, prior it was 1.32ng/mL in March 2022. \par \par He denies hematuria or dysuria. There have been no episodes of retention. He has not required catheterization of his bladder. He notes he had a urinary tract infection about 4-5 years ago. Denies any issues with constipation. \par \par Reports difficult getting erections for the same amount of time as his lower urinary tract symptoms. \par \par Had a CT abdomen and pelvis in March which showed a mildly enlarged prostate. \par  \par

## 2023-07-11 ENCOUNTER — APPOINTMENT (OUTPATIENT)
Dept: GASTROENTEROLOGY | Facility: CLINIC | Age: 43
End: 2023-07-11
Payer: COMMERCIAL

## 2023-07-11 VITALS
DIASTOLIC BLOOD PRESSURE: 70 MMHG | WEIGHT: 180 LBS | HEIGHT: 68 IN | HEART RATE: 68 BPM | TEMPERATURE: 97.7 F | BODY MASS INDEX: 27.28 KG/M2 | OXYGEN SATURATION: 96 % | SYSTOLIC BLOOD PRESSURE: 106 MMHG

## 2023-07-11 DIAGNOSIS — K21.9 GASTRO-ESOPHAGEAL REFLUX DISEASE W/OUT ESOPHAGITIS: ICD-10-CM

## 2023-07-11 DIAGNOSIS — K82.4 CHOLESTEROLOSIS OF GALLBLADDER: ICD-10-CM

## 2023-07-11 DIAGNOSIS — K80.20 CALCULUS OF GALLBLADDER W/OUT CHOLECYSTITIS W/OUT OBSTRUCTION: ICD-10-CM

## 2023-07-11 DIAGNOSIS — B18.1 CHRONIC VIRAL HEPATITIS B W/OUT DELTA-AGENT: ICD-10-CM

## 2023-07-11 DIAGNOSIS — K80.50 CALCULUS OF BILE DUCT W/OUT CHOLANGITIS OR CHOLECYSTITIS W/OUT OBSTRUCTION: ICD-10-CM

## 2023-07-11 PROCEDURE — 99214 OFFICE O/P EST MOD 30 MIN: CPT

## 2023-07-11 RX ORDER — MOMETASONE FUROATE 1 MG/G
0.1 CREAM TOPICAL TWICE DAILY
Qty: 1 | Refills: 5 | Status: DISCONTINUED | COMMUNITY
Start: 2023-05-31 | End: 2023-07-11

## 2023-07-11 RX ORDER — PREDNISONE 10 MG/1
10 TABLET ORAL
Qty: 18 | Refills: 0 | Status: DISCONTINUED | COMMUNITY
Start: 2023-05-31 | End: 2023-07-11

## 2023-07-11 NOTE — PHYSICAL EXAM
[None] : no edema [Normal] : normal bowel sounds, non-tender, no masses, soft, no no hepato-splenomegaly [Cervical Lymph Nodes Enlarged Posterior Bilaterally] : no posterior cervical lymphadenopathy [Supraclavicular Lymph Nodes Enlarged Bilaterally] : no supraclavicular lymphadenopathy [No CVA Tenderness] : no CVA  tenderness [Abnormal Walk] : normal gait

## 2023-07-11 NOTE — HISTORY OF PRESENT ILLNESS
[FreeTextEntry1] : 42-year-old man with chronic hepatitis B developed epigastric pain rating to the back over the July 4 weekend.  The pain lasted approximately 5 hours and then subsided.  He denies nausea or vomiting.  Recent CT of the abdomen showed cholelithiasis with stone in the neck of the gallbladder.  However abdominal ultrasound only showed a gallbladder polyp so the patient did not follow-up with the surgeon.

## 2023-07-11 NOTE — ASSESSMENT
[FreeTextEntry1] : Abdominal pain rule out gallstones.  Patient has conflicting studies with CT scan showing cholelithiasis and abdominal sonogram showing gallbladder polyp.  Patient questioning results of the CT of the abdomen.  Will refer for MRCP.  This was discussed with the patient.  He understands and all questions were answered.\par Chronic hepatitis B.  Continue medical therapy.

## 2023-07-12 LAB
ALBUMIN SERPL ELPH-MCNC: 4.8 G/DL
ALP BLD-CCNC: 93 U/L
ALT SERPL-CCNC: 58 U/L
AST SERPL-CCNC: 39 U/L
BILIRUB DIRECT SERPL-MCNC: 0.2 MG/DL
BILIRUB INDIRECT SERPL-MCNC: 0.6 MG/DL
BILIRUB SERPL-MCNC: 0.8 MG/DL
LPL SERPL-CCNC: 30 U/L
PROT SERPL-MCNC: 7 G/DL

## 2023-07-21 ENCOUNTER — OUTPATIENT (OUTPATIENT)
Dept: OUTPATIENT SERVICES | Facility: HOSPITAL | Age: 43
LOS: 1 days | End: 2023-07-21
Payer: COMMERCIAL

## 2023-07-21 ENCOUNTER — APPOINTMENT (OUTPATIENT)
Dept: MRI IMAGING | Facility: CLINIC | Age: 43
End: 2023-07-21
Payer: COMMERCIAL

## 2023-07-21 DIAGNOSIS — K80.20 CALCULUS OF GALLBLADDER WITHOUT CHOLECYSTITIS WITHOUT OBSTRUCTION: ICD-10-CM

## 2023-07-21 PROCEDURE — 74183 MRI ABD W/O CNTR FLWD CNTR: CPT | Mod: 26

## 2023-07-21 PROCEDURE — 74183 MRI ABD W/O CNTR FLWD CNTR: CPT

## 2023-07-21 PROCEDURE — A9585: CPT

## 2023-07-27 ENCOUNTER — NON-APPOINTMENT (OUTPATIENT)
Age: 43
End: 2023-07-27

## 2023-09-06 ENCOUNTER — APPOINTMENT (OUTPATIENT)
Dept: PULMONOLOGY | Facility: CLINIC | Age: 43
End: 2023-09-06

## 2023-11-01 ENCOUNTER — APPOINTMENT (OUTPATIENT)
Dept: PULMONOLOGY | Facility: CLINIC | Age: 43
End: 2023-11-01
Payer: COMMERCIAL

## 2023-11-01 VITALS — SYSTOLIC BLOOD PRESSURE: 121 MMHG | HEART RATE: 64 BPM | DIASTOLIC BLOOD PRESSURE: 73 MMHG | OXYGEN SATURATION: 96 %

## 2023-11-01 DIAGNOSIS — E78.00 PURE HYPERCHOLESTEROLEMIA, UNSPECIFIED: ICD-10-CM

## 2023-11-01 DIAGNOSIS — G47.00 INSOMNIA, UNSPECIFIED: ICD-10-CM

## 2023-11-01 DIAGNOSIS — E74.39 OTHER DISORDERS OF INTESTINAL CARBOHYDRATE ABSORPTION: ICD-10-CM

## 2023-11-01 DIAGNOSIS — Z23 ENCOUNTER FOR IMMUNIZATION: ICD-10-CM

## 2023-11-01 DIAGNOSIS — E55.9 VITAMIN D DEFICIENCY, UNSPECIFIED: ICD-10-CM

## 2023-11-01 PROCEDURE — 90686 IIV4 VACC NO PRSV 0.5 ML IM: CPT

## 2023-11-01 PROCEDURE — 99214 OFFICE O/P EST MOD 30 MIN: CPT | Mod: 25

## 2023-11-01 PROCEDURE — 36415 COLL VENOUS BLD VENIPUNCTURE: CPT

## 2023-11-01 PROCEDURE — G0008: CPT

## 2023-11-01 RX ORDER — MIRTAZAPINE 15 MG/1
15 TABLET, FILM COATED ORAL
Qty: 30 | Refills: 0 | Status: ACTIVE | COMMUNITY
Start: 2023-11-01 | End: 1900-01-01

## 2023-11-02 PROBLEM — E74.39 GLUCOSE INTOLERANCE: Status: ACTIVE | Noted: 2021-04-29

## 2023-11-02 PROBLEM — E78.00 ELEVATED CHOLESTEROL: Status: ACTIVE | Noted: 2022-09-30

## 2023-11-02 PROBLEM — Z23 NEED FOR INFLUENZA VACCINATION: Status: ACTIVE | Noted: 2020-11-25

## 2023-11-05 LAB
25(OH)D3 SERPL-MCNC: 41 NG/ML
ALBUMIN SERPL ELPH-MCNC: 4.9 G/DL
ALP BLD-CCNC: 108 U/L
ALT SERPL-CCNC: 51 U/L
ANION GAP SERPL CALC-SCNC: 12 MMOL/L
AST SERPL-CCNC: 36 U/L
BASOPHILS # BLD AUTO: 0.04 K/UL
BASOPHILS NFR BLD AUTO: 0.7 %
BILIRUB SERPL-MCNC: 0.6 MG/DL
BUN SERPL-MCNC: 15 MG/DL
CALCIUM SERPL-MCNC: 9.7 MG/DL
CHLORIDE SERPL-SCNC: 101 MMOL/L
CO2 SERPL-SCNC: 26 MMOL/L
CREAT SERPL-MCNC: 0.77 MG/DL
EGFR: 115 ML/MIN/1.73M2
EOSINOPHIL # BLD AUTO: 0.22 K/UL
EOSINOPHIL NFR BLD AUTO: 3.7 %
GLUCOSE SERPL-MCNC: 128 MG/DL
HCT VFR BLD CALC: 50.4 %
HGB BLD-MCNC: 17.3 G/DL
IMM GRANULOCYTES NFR BLD AUTO: 0.3 %
LYMPHOCYTES # BLD AUTO: 1.31 K/UL
LYMPHOCYTES NFR BLD AUTO: 21.8 %
MAN DIFF?: NORMAL
MCHC RBC-ENTMCNC: 30.7 PG
MCHC RBC-ENTMCNC: 34.3 GM/DL
MCV RBC AUTO: 89.4 FL
MONOCYTES # BLD AUTO: 0.46 K/UL
MONOCYTES NFR BLD AUTO: 7.7 %
NEUTROPHILS # BLD AUTO: 3.96 K/UL
NEUTROPHILS NFR BLD AUTO: 65.8 %
PLATELET # BLD AUTO: 228 K/UL
POTASSIUM SERPL-SCNC: 4.4 MMOL/L
PROT SERPL-MCNC: 7.6 G/DL
RBC # BLD: 5.64 M/UL
RBC # FLD: 12.2 %
SODIUM SERPL-SCNC: 139 MMOL/L
T4 FREE SERPL-MCNC: 1.2 NG/DL
TSH SERPL-ACNC: 1.39 UIU/ML
WBC # FLD AUTO: 6.01 K/UL

## 2023-12-08 ENCOUNTER — EMERGENCY (EMERGENCY)
Facility: HOSPITAL | Age: 43
LOS: 1 days | Discharge: HOME | End: 2023-12-08
Attending: EMERGENCY MEDICINE | Admitting: EMERGENCY MEDICINE
Payer: COMMERCIAL

## 2023-12-08 ENCOUNTER — APPOINTMENT (OUTPATIENT)
Dept: PULMONOLOGY | Facility: CLINIC | Age: 43
End: 2023-12-08

## 2023-12-08 VITALS
WEIGHT: 181 LBS | RESPIRATION RATE: 18 BRPM | DIASTOLIC BLOOD PRESSURE: 73 MMHG | OXYGEN SATURATION: 98 % | HEIGHT: 66 IN | TEMPERATURE: 97 F | SYSTOLIC BLOOD PRESSURE: 118 MMHG | HEART RATE: 65 BPM

## 2023-12-08 LAB
ALBUMIN SERPL ELPH-MCNC: 4 G/DL — SIGNIFICANT CHANGE UP (ref 3.3–5)
ALBUMIN SERPL ELPH-MCNC: 4 G/DL — SIGNIFICANT CHANGE UP (ref 3.3–5)
ALP SERPL-CCNC: 105 U/L — SIGNIFICANT CHANGE UP (ref 40–120)
ALP SERPL-CCNC: 105 U/L — SIGNIFICANT CHANGE UP (ref 40–120)
ALT FLD-CCNC: 55 U/L — HIGH (ref 10–45)
ALT FLD-CCNC: 55 U/L — HIGH (ref 10–45)
ANION GAP SERPL CALC-SCNC: 8 MMOL/L — SIGNIFICANT CHANGE UP (ref 5–17)
ANION GAP SERPL CALC-SCNC: 8 MMOL/L — SIGNIFICANT CHANGE UP (ref 5–17)
AST SERPL-CCNC: 25 U/L — SIGNIFICANT CHANGE UP (ref 10–40)
AST SERPL-CCNC: 25 U/L — SIGNIFICANT CHANGE UP (ref 10–40)
BASOPHILS # BLD AUTO: 0.04 K/UL — SIGNIFICANT CHANGE UP (ref 0–0.2)
BASOPHILS # BLD AUTO: 0.04 K/UL — SIGNIFICANT CHANGE UP (ref 0–0.2)
BASOPHILS NFR BLD AUTO: 0.7 % — SIGNIFICANT CHANGE UP (ref 0–2)
BASOPHILS NFR BLD AUTO: 0.7 % — SIGNIFICANT CHANGE UP (ref 0–2)
BILIRUB SERPL-MCNC: 0.6 MG/DL — SIGNIFICANT CHANGE UP (ref 0.2–1.2)
BILIRUB SERPL-MCNC: 0.6 MG/DL — SIGNIFICANT CHANGE UP (ref 0.2–1.2)
BUN SERPL-MCNC: 13 MG/DL — SIGNIFICANT CHANGE UP (ref 7–23)
BUN SERPL-MCNC: 13 MG/DL — SIGNIFICANT CHANGE UP (ref 7–23)
CALCIUM SERPL-MCNC: 9 MG/DL — SIGNIFICANT CHANGE UP (ref 8.4–10.5)
CALCIUM SERPL-MCNC: 9 MG/DL — SIGNIFICANT CHANGE UP (ref 8.4–10.5)
CHLORIDE SERPL-SCNC: 104 MMOL/L — SIGNIFICANT CHANGE UP (ref 96–108)
CHLORIDE SERPL-SCNC: 104 MMOL/L — SIGNIFICANT CHANGE UP (ref 96–108)
CO2 SERPL-SCNC: 28 MMOL/L — SIGNIFICANT CHANGE UP (ref 22–31)
CO2 SERPL-SCNC: 28 MMOL/L — SIGNIFICANT CHANGE UP (ref 22–31)
CREAT SERPL-MCNC: 0.88 MG/DL — SIGNIFICANT CHANGE UP (ref 0.5–1.3)
CREAT SERPL-MCNC: 0.88 MG/DL — SIGNIFICANT CHANGE UP (ref 0.5–1.3)
EGFR: 110 ML/MIN/1.73M2 — SIGNIFICANT CHANGE UP
EGFR: 110 ML/MIN/1.73M2 — SIGNIFICANT CHANGE UP
EOSINOPHIL # BLD AUTO: 0.15 K/UL — SIGNIFICANT CHANGE UP (ref 0–0.5)
EOSINOPHIL # BLD AUTO: 0.15 K/UL — SIGNIFICANT CHANGE UP (ref 0–0.5)
EOSINOPHIL NFR BLD AUTO: 2.7 % — SIGNIFICANT CHANGE UP (ref 0–6)
EOSINOPHIL NFR BLD AUTO: 2.7 % — SIGNIFICANT CHANGE UP (ref 0–6)
GLUCOSE SERPL-MCNC: 115 MG/DL — HIGH (ref 70–99)
GLUCOSE SERPL-MCNC: 115 MG/DL — HIGH (ref 70–99)
HCT VFR BLD CALC: 46.5 % — SIGNIFICANT CHANGE UP (ref 39–50)
HCT VFR BLD CALC: 46.5 % — SIGNIFICANT CHANGE UP (ref 39–50)
HGB BLD-MCNC: 16.4 G/DL — SIGNIFICANT CHANGE UP (ref 13–17)
HGB BLD-MCNC: 16.4 G/DL — SIGNIFICANT CHANGE UP (ref 13–17)
IMM GRANULOCYTES NFR BLD AUTO: 0.2 % — SIGNIFICANT CHANGE UP (ref 0–0.9)
IMM GRANULOCYTES NFR BLD AUTO: 0.2 % — SIGNIFICANT CHANGE UP (ref 0–0.9)
LIDOCAIN IGE QN: 47 U/L — SIGNIFICANT CHANGE UP (ref 16–77)
LIDOCAIN IGE QN: 47 U/L — SIGNIFICANT CHANGE UP (ref 16–77)
LYMPHOCYTES # BLD AUTO: 1.09 K/UL — SIGNIFICANT CHANGE UP (ref 1–3.3)
LYMPHOCYTES # BLD AUTO: 1.09 K/UL — SIGNIFICANT CHANGE UP (ref 1–3.3)
LYMPHOCYTES # BLD AUTO: 19.6 % — SIGNIFICANT CHANGE UP (ref 13–44)
LYMPHOCYTES # BLD AUTO: 19.6 % — SIGNIFICANT CHANGE UP (ref 13–44)
MCHC RBC-ENTMCNC: 30.3 PG — SIGNIFICANT CHANGE UP (ref 27–34)
MCHC RBC-ENTMCNC: 30.3 PG — SIGNIFICANT CHANGE UP (ref 27–34)
MCHC RBC-ENTMCNC: 35.3 GM/DL — SIGNIFICANT CHANGE UP (ref 32–36)
MCHC RBC-ENTMCNC: 35.3 GM/DL — SIGNIFICANT CHANGE UP (ref 32–36)
MCV RBC AUTO: 86 FL — SIGNIFICANT CHANGE UP (ref 80–100)
MCV RBC AUTO: 86 FL — SIGNIFICANT CHANGE UP (ref 80–100)
MONOCYTES # BLD AUTO: 0.38 K/UL — SIGNIFICANT CHANGE UP (ref 0–0.9)
MONOCYTES # BLD AUTO: 0.38 K/UL — SIGNIFICANT CHANGE UP (ref 0–0.9)
MONOCYTES NFR BLD AUTO: 6.8 % — SIGNIFICANT CHANGE UP (ref 2–14)
MONOCYTES NFR BLD AUTO: 6.8 % — SIGNIFICANT CHANGE UP (ref 2–14)
NEUTROPHILS # BLD AUTO: 3.88 K/UL — SIGNIFICANT CHANGE UP (ref 1.8–7.4)
NEUTROPHILS # BLD AUTO: 3.88 K/UL — SIGNIFICANT CHANGE UP (ref 1.8–7.4)
NEUTROPHILS NFR BLD AUTO: 70 % — SIGNIFICANT CHANGE UP (ref 43–77)
NEUTROPHILS NFR BLD AUTO: 70 % — SIGNIFICANT CHANGE UP (ref 43–77)
NRBC # BLD: 0 /100 WBCS — SIGNIFICANT CHANGE UP (ref 0–0)
NRBC # BLD: 0 /100 WBCS — SIGNIFICANT CHANGE UP (ref 0–0)
PLATELET # BLD AUTO: 221 K/UL — SIGNIFICANT CHANGE UP (ref 150–400)
PLATELET # BLD AUTO: 221 K/UL — SIGNIFICANT CHANGE UP (ref 150–400)
POTASSIUM SERPL-MCNC: 3.5 MMOL/L — SIGNIFICANT CHANGE UP (ref 3.5–5.3)
POTASSIUM SERPL-MCNC: 3.5 MMOL/L — SIGNIFICANT CHANGE UP (ref 3.5–5.3)
POTASSIUM SERPL-SCNC: 3.5 MMOL/L — SIGNIFICANT CHANGE UP (ref 3.5–5.3)
POTASSIUM SERPL-SCNC: 3.5 MMOL/L — SIGNIFICANT CHANGE UP (ref 3.5–5.3)
PROT SERPL-MCNC: 7.4 G/DL — SIGNIFICANT CHANGE UP (ref 6–8.3)
PROT SERPL-MCNC: 7.4 G/DL — SIGNIFICANT CHANGE UP (ref 6–8.3)
RBC # BLD: 5.41 M/UL — SIGNIFICANT CHANGE UP (ref 4.2–5.8)
RBC # BLD: 5.41 M/UL — SIGNIFICANT CHANGE UP (ref 4.2–5.8)
RBC # FLD: 12 % — SIGNIFICANT CHANGE UP (ref 10.3–14.5)
RBC # FLD: 12 % — SIGNIFICANT CHANGE UP (ref 10.3–14.5)
SODIUM SERPL-SCNC: 140 MMOL/L — SIGNIFICANT CHANGE UP (ref 135–145)
SODIUM SERPL-SCNC: 140 MMOL/L — SIGNIFICANT CHANGE UP (ref 135–145)
WBC # BLD: 5.55 K/UL — SIGNIFICANT CHANGE UP (ref 3.8–10.5)
WBC # BLD: 5.55 K/UL — SIGNIFICANT CHANGE UP (ref 3.8–10.5)
WBC # FLD AUTO: 5.55 K/UL — SIGNIFICANT CHANGE UP (ref 3.8–10.5)
WBC # FLD AUTO: 5.55 K/UL — SIGNIFICANT CHANGE UP (ref 3.8–10.5)

## 2023-12-08 PROCEDURE — 80053 COMPREHEN METABOLIC PANEL: CPT

## 2023-12-08 PROCEDURE — 96374 THER/PROPH/DIAG INJ IV PUSH: CPT

## 2023-12-08 PROCEDURE — 85025 COMPLETE CBC W/AUTO DIFF WBC: CPT

## 2023-12-08 PROCEDURE — 83690 ASSAY OF LIPASE: CPT

## 2023-12-08 PROCEDURE — 99284 EMERGENCY DEPT VISIT MOD MDM: CPT

## 2023-12-08 PROCEDURE — 36415 COLL VENOUS BLD VENIPUNCTURE: CPT

## 2023-12-08 PROCEDURE — 96375 TX/PRO/DX INJ NEW DRUG ADDON: CPT

## 2023-12-08 PROCEDURE — 99284 EMERGENCY DEPT VISIT MOD MDM: CPT | Mod: 25

## 2023-12-08 PROCEDURE — 96361 HYDRATE IV INFUSION ADD-ON: CPT

## 2023-12-08 RX ORDER — FAMOTIDINE 10 MG/ML
20 INJECTION INTRAVENOUS ONCE
Refills: 0 | Status: DISCONTINUED | OUTPATIENT
Start: 2023-12-08 | End: 2023-12-08

## 2023-12-08 RX ORDER — ONDANSETRON 8 MG/1
4 TABLET, FILM COATED ORAL ONCE
Refills: 0 | Status: COMPLETED | OUTPATIENT
Start: 2023-12-08 | End: 2023-12-08

## 2023-12-08 RX ORDER — LIDOCAINE 4 G/100G
10 CREAM TOPICAL ONCE
Refills: 0 | Status: COMPLETED | OUTPATIENT
Start: 2023-12-08 | End: 2023-12-08

## 2023-12-08 RX ORDER — KETOROLAC TROMETHAMINE 30 MG/ML
30 SYRINGE (ML) INJECTION ONCE
Refills: 0 | Status: DISCONTINUED | OUTPATIENT
Start: 2023-12-08 | End: 2023-12-08

## 2023-12-08 RX ORDER — SODIUM CHLORIDE 9 MG/ML
1000 INJECTION INTRAMUSCULAR; INTRAVENOUS; SUBCUTANEOUS ONCE
Refills: 0 | Status: COMPLETED | OUTPATIENT
Start: 2023-12-08 | End: 2023-12-08

## 2023-12-08 RX ORDER — FAMOTIDINE 10 MG/ML
20 INJECTION INTRAVENOUS ONCE
Refills: 0 | Status: COMPLETED | OUTPATIENT
Start: 2023-12-08 | End: 2023-12-08

## 2023-12-08 RX ADMIN — Medication 30 MILLIGRAM(S): at 05:20

## 2023-12-08 RX ADMIN — ONDANSETRON 4 MILLIGRAM(S): 8 TABLET, FILM COATED ORAL at 04:45

## 2023-12-08 RX ADMIN — SODIUM CHLORIDE 1000 MILLILITER(S): 9 INJECTION INTRAMUSCULAR; INTRAVENOUS; SUBCUTANEOUS at 04:43

## 2023-12-08 RX ADMIN — SODIUM CHLORIDE 1000 MILLILITER(S): 9 INJECTION INTRAMUSCULAR; INTRAVENOUS; SUBCUTANEOUS at 05:35

## 2023-12-08 RX ADMIN — LIDOCAINE 10 MILLILITER(S): 4 CREAM TOPICAL at 04:45

## 2023-12-08 RX ADMIN — Medication 30 MILLIGRAM(S): at 04:50

## 2023-12-08 RX ADMIN — Medication 30 MILLILITER(S): at 04:44

## 2023-12-08 RX ADMIN — FAMOTIDINE 100 MILLIGRAM(S): 10 INJECTION INTRAVENOUS at 04:40

## 2023-12-08 NOTE — ED ADULT TRIAGE NOTE - CHIEF COMPLAINT QUOTE
I have abdominal pain, epigastric area for 5 hours with a little bit of nausea. I have Hep B as a child"

## 2023-12-08 NOTE — ED PROVIDER NOTE - CLINICAL SUMMARY MEDICAL DECISION MAKING FREE TEXT BOX
44 y/o male with h/o gastric ulcers in ED c/o epigastric pain tonight.   pt states similar episode 2 days ago that resolved after taking pepcid and maalox.   pt states tonight took medications again but pain persisted prompting ED visit.   pt states pain feel similar to previous episodes.   tolerating PO.   pt denies any fever, HA, cp, sob, diarrhea.   states positive nausea with no emesis.   no sick contacts.    pt well appearing with no acute distress.   abd soft and NT.   good BS.   possible GERD vs gastritis vs ulcers.   will check labs, IVF, meds and reeval 44 y/o male with h/o gastric ulcers in ED c/o epigastric pain tonight.   pt states similar episode 2 days ago that resolved after taking pepcid and maalox.   pt states tonight took medications again but pain persisted prompting ED visit.   pt states pain feel similar to previous episodes.   tolerating PO.   pt denies any fever, HA, cp, sob, diarrhea.   states positive nausea with no emesis.   no sick contacts.    pt well appearing with no acute distress.   abd soft and NT.   good BS.   possible GERD vs gastritis vs ulcers.   will check labs, IVF, meds and reeval    0515:   pt told of results.   states feels better and will f/u 42 y/o male with h/o gastric ulcers in ED c/o epigastric pain tonight.   pt states similar episode 2 days ago that resolved after taking pepcid and maalox.   pt states tonight took medications again but pain persisted prompting ED visit.   pt states pain feel similar to previous episodes.   tolerating PO.   pt denies any fever, HA, cp, sob, diarrhea.   states positive nausea with no emesis.   no sick contacts.    pt well appearing with no acute distress.   abd soft and NT.   good BS.   possible GERD vs gastritis vs ulcers.   will check labs, IVF, meds and reeval    0515:   pt told of results.   states feels better and will f/u

## 2023-12-08 NOTE — ED PROVIDER NOTE - OBJECTIVE STATEMENT
44 y/o male with h/o gastric ulcers in ED c/o epigastric pain tonight.   pt states similar episode 2 days ago that resolved after taking pepcid and maalox.   pt states tonight took medications again but pain persisted prompting ED visit.   pt states pain feel similar to previous episodes.   tolerating PO.   pt denies any fever, HA, cp, sob, diarrhea.   states positive nausea with no emesis.   no sick contacts. 42 y/o male with h/o gastric ulcers in ED c/o epigastric pain tonight.   pt states similar episode 2 days ago that resolved after taking pepcid and maalox.   pt states tonight took medications again but pain persisted prompting ED visit.   pt states pain feel similar to previous episodes.   tolerating PO.   pt denies any fever, HA, cp, sob, diarrhea.   states positive nausea with no emesis.   no sick contacts.

## 2023-12-08 NOTE — ED PROVIDER NOTE - NSFOLLOWUPINSTRUCTIONS_ED_ALL_ED_FT
please follow up with your GI doctor in 2-3 days.   take over the counter pepcid and maalox as directed.   drink plenty of fluids.   return to ED for any concerns

## 2023-12-08 NOTE — ED ADULT NURSE NOTE - OBJECTIVE STATEMENT
Pt is alert, came to the ER due to epigastric pain with nausea for 5 hours. No fever, cough or SOB, chest pain.

## 2023-12-08 NOTE — ED ADULT NURSE NOTE - NSFALLUNIVINTERV_ED_ALL_ED
Bed/Stretcher in lowest position, wheels locked, appropriate side rails in place/Call bell, personal items and telephone in reach/Instruct patient to call for assistance before getting out of bed/chair/stretcher/Non-slip footwear applied when patient is off stretcher/[place] to call system/Physically safe environment - no spills, clutter or unnecessary equipment/Purposeful proactive rounding/Room/bathroom lighting operational, light cord in reach Bed/Stretcher in lowest position, wheels locked, appropriate side rails in place/Call bell, personal items and telephone in reach/Instruct patient to call for assistance before getting out of bed/chair/stretcher/Non-slip footwear applied when patient is off stretcher/[place] to call system/Physically safe environment - no spills, clutter or unnecessary equipment/Purposeful proactive rounding/Room/bathroom lighting operational, light cord in reach

## 2023-12-08 NOTE — ED PROVIDER NOTE - PATIENT PORTAL LINK FT
You can access the FollowMyHealth Patient Portal offered by [facility] by registering at the following website: [email or web address]. By joining [facility]’s FollowMyHealth portal, you will also be able to view your health information using other applications (apps) compatible with our system. You can access the FollowMyHealth Patient Portal offered by [facility] by registering at the following website: [email or web address]. By joining [facility]’s FollowMyHealth portal, you will also be able to view your health information using other applications (apps) compatible with our system.

## 2023-12-08 NOTE — ED PROVIDER NOTE - CONSTITUTIONAL, MLM
BPIC DAILY PROGRESS NOTE      SUBJECTIVE:   D/w RRT this am and transferred him to ICU. Had elevated LA.Patient seen and examined. Per RN, patient has had many episodes of coffee-ground emesis today. She states his urine output is normal but has been dark and concentrated.     He is laying in bed with wife at bedside. Per wife, the patient has been getting worse throughout the day.     OBJECTIVE:    VITAL SIGNS:     Vital Last Value 24 Hour Range   Temperature 98.4 °F (36.9 °C) (12/30/19 0900) Temp  Min: 97.3 °F (36.3 °C)  Max: 99.5 °F (37.5 °C)   Pulse 118 (12/30/19 0844) Pulse  Min: 97  Max: 133   Respiratory 20 (12/30/19 0722) Resp  Min: 12  Max: 20   Non-Invasive  Blood Pressure 131/88 (12/30/19 0837) BP  Min: 128/84  Max: 148/96   Pulse Oximetry 95 % (12/30/19 0844) SpO2  Min: 89 %  Max: 97 %     Vital Today Admitted   Weight 110.5 kg (243 lb 9.7 oz) (12/30/19 0900) Weight: 102.1 kg (225 lb) (12/28/19 1249)   Height N/A Height: 6' 2\" (188 cm) (12/28/19 1249)   Body Mass Index N/A BMI (Calculated): 28.89 (12/28/19 1249)     INTAKE/OUTPUT:      Intake/Output Summary (Last 24 hours) at 12/30/2019 1016  Last data filed at 12/30/2019 0700  Gross per 24 hour   Intake 1575 ml   Output 300 ml   Net 1275 ml         PHYSICAL EXAM:    Constitutional: He is confused    HENT:   Head: Normocephalic and atraumatic.   Eyes: Pupils are equal, round, and reactive to light. Conjunctivae and EOM are normal.   Neck: Normal range of motion. Neck supple.   Cardiovascular: Normal rate, regular rhythm, normal heart sounds and intact distal pulses.   Pulmonary/Chest:decreased BS posterior lung fields    Abdominal: . Bowel sounds are normal. Distended.No tenderness . There is no rebound and no guarding. Musculoskeletal: Normal range of motion.         General: No edema.     Neurological: No focal deficits  Skin: Skin is warm, dry and intact. No bruising noted. Yellowish discolouration       LABORATORY DATA:    CHEM7:  Sodium (mmol/L)    Date Value   12/30/2019 128 (L)     Potassium (mmol/L)   Date Value   12/30/2019 4.3     Chloride (mmol/L)   Date Value   12/30/2019 89 (L)     Glucose (mg/dL)   Date Value   12/30/2019 121 (H)     CALCIUM (mg/dL)   Date Value   12/30/2019 8.7     Carbon Dioxide (mmol/L)   Date Value   12/30/2019 14 (L)     BUN (mg/dL)   Date Value   12/30/2019 10     Creatinine (mg/dL)   Date Value   12/30/2019 0.51 (L)       CBC:  WBC (K/mcL)   Date Value   12/30/2019 9.6     RBC (mil/mcL)   Date Value   12/30/2019 3.59 (L)     HCT (%)   Date Value   12/30/2019 35.4 (L)     HGB (g/dL)   Date Value   12/30/2019 12.5 (L)     PLT (K/mcL)   Date Value   12/30/2019 108 (L)      Coags:  INR (no units)   Date Value   12/28/2019 1.4        Hepatic Panel:  AST/SGOT (Units/L)   Date Value   12/30/2019 369 (H)     ALT/SGPT (Units/L)   Date Value   12/30/2019 78 (H)     No results found for: GGTP  ALK PHOSPHATASE (Units/L)   Date Value   12/30/2019 67     TOTAL BILIRUBIN (mg/dL)   Date Value   12/30/2019 22.2 (H)         IMAGING STUDIES:    US GALLBLADDER   Final Result      Technically difficult examination due to patient body habitus and movement      1.   Pancreas was not visualized due to shadowing from overlying bowel.      2.   Coarsened, echogenic appearance of liver with nodular contour most suggestive of cirrhosis.  No focal hepatic lesion detected.      3.   Moderate amount of ascites.      4.   Distended gallbladder containing echogenic material which could represent biliary sludge.  Nonspecific abdominal bowel wall thickening measuring 3-4 mm.      5.   No indication of intrahepatic bile duct dilation.  Common duct could not be identified.      Electronically Signed by: MIC MEDRANO M.D.    Signed on: 12/28/2019 4:42 PM          IR PARACENTESIS    (Results Pending)   CT ABDOMEN PELVIS WO CONTRAST    (Results Pending)                               ASSESSMENT/PLAN:       Alcoholic hepatitis and  Cirrhosis with Ascites     chronic alcohol abuse   Patient scheduled for paracentesis    US Gallbladder as noted above   CT A/P ordered   Lipase 906 (12/28)   ALT 82 --> 78,  --> 369, T. bili 19.0 --> 22.2 on 12/28   Diet: NPO  Gi Dr fu following - For the alcoholic hepatitis, his calculated Maddrey discriminant score is 42, thereby indicative of a poor prognosis and possible benefit from initiation of prednisolone.   On emperic Rocephin for possible SBP    Given severity of his alcoholic hepatitis, overall prognosis is quite poor.  The patient is not a transplant candidate given recent severe alcohol abuse.    Coffee Ground emesis   Will place NG   Monitor H/H   On protonix  Per GI -  If his hemoglobin is declining and hematemesis persists, will need EGD.     Metabolic acidosis likely 2/2 worsening Lactic acidosis though the patient remains afebrile, without leukocytosis, and with no abdominal pain. These elevations could likely be due to his cirrhosis, decreased lactate clearance due to his hepatic dysfunction, component of alcoholic/starvation ketosis   D/w Nephrology     Acute alcohol intoxication in a patient with chronic alcohol abuse  Acute alcohol withdrawal delirium   EtOH 272 upon arrival   Monitor via CIWA protocol (scoring 0-19 in last 24 hours)   Continue with prn IV Ativan per CIWA protocol. Received phenobarb today    Monitoring on telemetry    Tachycardia   HR elevated in 120s-130s   Continue IVFs     Hyponatremia josé multifactorial  intravascular volume depletion,  significant liver issues which will cause increase in antidiuretic hormone secretion and retention of water,  Nutritional deficiencies will then limit excretion of urinary free water   Na 126 --> 128   Continue with IVFs   Nephrology (Dr. Lomas) consulted     Diabetes mellitus type 2   BS well controlled   Hold home metformin   Monitoring via Accu-Cheks, cover with SSI     Primary hypertension - BP controlled, hold home lisinopril  Depression    Coronary artery disease - Hold home lisinopril, rosuvastatin, and Plavix  CVA (2019) - Hold home rosuvastatin and Plavix     DVT: Lovenox sq    DISPOSITION: Rapid response called this morning due to rising lactic acid levels. Patient transferred to ICU. Awaiting paracentesis and results of CT Abdomen. Pt will likely need NG tube. D/w Critical care NP, Nephrology. D/w patien't wife . Overall poor prognosis    PCP:  Faina Bryant PA-C     Charting performed by sindy Mueller for Dr. Juan Erwin    All medical record entries made by the scribe were at my direction. I have reviewed the chart and agree that the record accurately reflects my personal performance of the history, physical exam, hospital course, and assessment and plan.        Well appearing, awake, alert, oriented to person, place, time/situation and in no apparent distress. normal...

## 2023-12-09 ENCOUNTER — INPATIENT (INPATIENT)
Facility: HOSPITAL | Age: 43
LOS: 2 days | Discharge: HOME | DRG: 445 | End: 2023-12-12
Attending: STUDENT IN AN ORGANIZED HEALTH CARE EDUCATION/TRAINING PROGRAM | Admitting: INTERNAL MEDICINE
Payer: COMMERCIAL

## 2023-12-09 VITALS
RESPIRATION RATE: 18 BRPM | TEMPERATURE: 98 F | HEART RATE: 54 BPM | SYSTOLIC BLOOD PRESSURE: 123 MMHG | HEIGHT: 66 IN | WEIGHT: 179.9 LBS | DIASTOLIC BLOOD PRESSURE: 84 MMHG | OXYGEN SATURATION: 99 %

## 2023-12-09 PROCEDURE — 99285 EMERGENCY DEPT VISIT HI MDM: CPT

## 2023-12-09 RX ORDER — MORPHINE SULFATE 50 MG/1
4 CAPSULE, EXTENDED RELEASE ORAL ONCE
Refills: 0 | Status: DISCONTINUED | OUTPATIENT
Start: 2023-12-09 | End: 2023-12-09

## 2023-12-09 RX ORDER — FAMOTIDINE 10 MG/ML
20 INJECTION INTRAVENOUS ONCE
Refills: 0 | Status: COMPLETED | OUTPATIENT
Start: 2023-12-09 | End: 2023-12-09

## 2023-12-09 RX ORDER — ONDANSETRON 8 MG/1
4 TABLET, FILM COATED ORAL ONCE
Refills: 0 | Status: DISCONTINUED | OUTPATIENT
Start: 2023-12-09 | End: 2023-12-10

## 2023-12-09 RX ORDER — SODIUM CHLORIDE 9 MG/ML
1000 INJECTION INTRAMUSCULAR; INTRAVENOUS; SUBCUTANEOUS ONCE
Refills: 0 | Status: COMPLETED | OUTPATIENT
Start: 2023-12-09 | End: 2023-12-09

## 2023-12-09 NOTE — ED ADULT NURSE NOTE - NSFALLUNIVINTERV_ED_ALL_ED
Bed/Stretcher in lowest position, wheels locked, appropriate side rails in place/Call bell, personal items and telephone in reach/Instruct patient to call for assistance before getting out of bed/chair/stretcher/Non-slip footwear applied when patient is off stretcher/[place] to call system/Physically safe environment - no spills, clutter or unnecessary equipment/Purposeful proactive rounding/Room/bathroom lighting operational, light cord in reach Bed/Stretcher in lowest position, wheels locked, appropriate side rails in place/Call bell, personal items and telephone in reach/Instruct patient to call for assistance before getting out of bed/chair/stretcher/Non-slip footwear applied when patient is off stretcher/[place] to call system/Physically safe environment - no spills, clutter or unnecessary equipment/Purposeful proactive rounding/Room/bathroom lighting operational, light cord in reach

## 2023-12-09 NOTE — ED ADULT NURSE NOTE - NS PRO PASSIVE SMOKE EXP
Return if symptoms worsen or fail to improve.    During the hours of 8:00 am to 5:00 pm Monday through Thursday, please contact us at the [facility] [phone]. You can also contact us anytime through [employer] to make an appointment, with questions for your provider and medication refills.    If it is urgent that you speak with someone outside of these hours, our [facility] Call Center will be able to assist you at [phone].    Thank you for choosing [facility] for your Dermatology care.    If any of Yumiko's areas becomes bigger rapidly, bleed easily or for no reason, change color,  or are painful call for sooner appointment.     WOUND CARE AFTER SKIN BIOPSY  Patient Instructions    1. Keep area dry for 24 hours.    2. Wash your hands thoroughly with soap and water for at least 20 seconds before any wound care.    3. After the first 24 hours, gently cleanse the biopsy site 1-2 times each day with soap and water, pat dry, and apply ointment and a new bandage to the area until it is healed. Keeping the area covered will allow it to heal more quickly than if it is left open to the air. It may take 2-4 weeks for biopsies on the trunk and extremities to heal; may take 1-2 weeks for head and neck sites    4. If stitches have been placed in the site, continue the daily wound care as outlined above until the stitches are removed in our office.    5. If bleeding at the site occurs, hold firm pressure with a clean towel for 15 minutes. Do not look at the area or release pressure for that 15 minutes. Call our office if the area continuous to bleed after holding pressure for 15 minutes.    6. Call our office immediately if any signs of infection occur at the biopsy site (pain, drainage, pus or red streaks from the site) or if bleeding or excessive swelling occurs. A thin pink rim surrounding the site is normal.    7. You will be notified of results by letter or phone call ( or at a recheck appointment)  If a month or more has passed since your procedure and you have not received results, please call our office.          We can be reached at:  [facility]: [phone]    If it is urgent that you speak with someone outside of these hours, our [facility] Call Center will be able to assist you at [phone].    Thank you for choosing [facility] for your Dermatology care.      Pyogenic Granuloma    What is pyogenic granuloma?  A pyogenic granuloma is a raised growth of blood vessels. It looks like a small, red, soft bump on the skin. It appears on the face or upper part of the body. It can happen at any age. It is never present when children are born. Most often, the diagnosis is made by the primary care provider or a dermatologist during a physical exam. A skin biopsy may be done to confirm the diagnosis.    What causes it?  The exact cause is not known. They may be caused by an injury to the skin, such as a small scratch or insect bite, or they may appear on their own. Your child cannot “catch” a pyogenic granuloma from another person. It may bleed easily when injured and the bleeding may be hard to stop.    How is it treated?  A pyogenic granuloma will not go away by itself. Large granulomas are removed by surgery.  Small ones may be treated with the pulsed dye laser. Some need both surgery and the pulsed dye laser as treatment. When the pulsed dye laser is used, more than one treatment is often needed. Most pyogenic granulomas will not come back after they are removed.  Some may grow back after treatment.    What do I do if it bleeds?  Hold firm and constant pressure directly on the pyogenic granuloma for 5 to 15 minutes. This should stop the bleeding.    ALERT: Call your child’s doctor, nurse, or clinic if you have any questions or concerns or if your child:  • Has bleeding you are not able to stop with 15 minutes of firm, constant pressure.  If this occurs, go to the Emergency Room.  • Has  special health care needs that were not covered by this information.      Cafe-Au-Lait     What is a café-au-lait macule?  A café-aut-lait macule is a common birthmark, presenting as a hyperpigmented skin patch with a sharp border and diameter of > 0.5 cm. It is also known as circumscribed café-aut-lait hypermelanosis, von Recklinghausen spot, or abbreviated as \"CALM\".      Who gets café-au-lait macules?  Café-au-lait macules are usually present at birth (congenital) or appear in early infancy. They sometimes become apparent later in infancy, especially after exposure to the sun, which darkens the colour.    What causes café-au-lait macules?  The brown colour of a café-au-lait macule is due to a pigment called melanin, which is produced in the skin by cells called melanocytes.   The epidermal melanocytes of an isolated café-au-lait macule have excessive numbers of melanosomes (intracellular pigment granules). This is known as epidermal melanotic hypermelanosis.   The café-au-lait macules associated with NF type 1 and Leopard syndrome have increased proliferation of epidermal melanocytes (epidermal melanocytic hyperplasia).   A café-au-lait macules is not classified as a congenital melanocytic naevus.     What is the treatment for café-au-lait macules?  No medical care is required to treat café-aut-lait macules. Lasers reported to have successfully faded café-au-lait macules include:  Pulsed-dye laser   Er:YAG laser   Q-switched Nd:YAG laser   Q-switched nikko or alexandrite laser       Unknown

## 2023-12-09 NOTE — ED ADULT NURSE NOTE - OBJECTIVE STATEMENT
Pt came into ED stating "I am having severe epigastric pain. I was here yesterday and they gave me medication and to follow up with GI but the pain is too severe". pt denies N/V, Chest pain, Fevers

## 2023-12-10 DIAGNOSIS — C15.9 MALIGNANT NEOPLASM OF ESOPHAGUS, UNSPECIFIED: Chronic | ICD-10-CM

## 2023-12-10 DIAGNOSIS — K80.50 CALCULUS OF BILE DUCT WITHOUT CHOLANGITIS OR CHOLECYSTITIS WITHOUT OBSTRUCTION: ICD-10-CM

## 2023-12-10 DIAGNOSIS — Z86.79 PERSONAL HISTORY OF OTHER DISEASES OF THE CIRCULATORY SYSTEM: ICD-10-CM

## 2023-12-10 DIAGNOSIS — B19.10 UNSPECIFIED VIRAL HEPATITIS B WITHOUT HEPATIC COMA: ICD-10-CM

## 2023-12-10 DIAGNOSIS — E87.6 HYPOKALEMIA: ICD-10-CM

## 2023-12-10 DIAGNOSIS — Z29.9 ENCOUNTER FOR PROPHYLACTIC MEASURES, UNSPECIFIED: ICD-10-CM

## 2023-12-10 DIAGNOSIS — K21.9 GASTRO-ESOPHAGEAL REFLUX DISEASE WITHOUT ESOPHAGITIS: ICD-10-CM

## 2023-12-10 LAB
ALBUMIN SERPL ELPH-MCNC: 4 G/DL — SIGNIFICANT CHANGE UP (ref 3.3–5)
ALBUMIN SERPL ELPH-MCNC: 4 G/DL — SIGNIFICANT CHANGE UP (ref 3.3–5)
ALBUMIN SERPL ELPH-MCNC: 4.5 G/DL — SIGNIFICANT CHANGE UP (ref 3.3–5)
ALBUMIN SERPL ELPH-MCNC: 4.5 G/DL — SIGNIFICANT CHANGE UP (ref 3.3–5)
ALP SERPL-CCNC: 108 U/L — SIGNIFICANT CHANGE UP (ref 40–120)
ALP SERPL-CCNC: 108 U/L — SIGNIFICANT CHANGE UP (ref 40–120)
ALP SERPL-CCNC: 113 U/L — SIGNIFICANT CHANGE UP (ref 40–120)
ALP SERPL-CCNC: 113 U/L — SIGNIFICANT CHANGE UP (ref 40–120)
ALT FLD-CCNC: 56 U/L — HIGH (ref 10–45)
ANION GAP SERPL CALC-SCNC: 12 MMOL/L — SIGNIFICANT CHANGE UP (ref 5–17)
ANION GAP SERPL CALC-SCNC: 12 MMOL/L — SIGNIFICANT CHANGE UP (ref 5–17)
ANION GAP SERPL CALC-SCNC: 9 MMOL/L — SIGNIFICANT CHANGE UP (ref 5–17)
ANION GAP SERPL CALC-SCNC: 9 MMOL/L — SIGNIFICANT CHANGE UP (ref 5–17)
AST SERPL-CCNC: 27 U/L — SIGNIFICANT CHANGE UP (ref 10–40)
AST SERPL-CCNC: 27 U/L — SIGNIFICANT CHANGE UP (ref 10–40)
AST SERPL-CCNC: 28 U/L — SIGNIFICANT CHANGE UP (ref 10–40)
AST SERPL-CCNC: 28 U/L — SIGNIFICANT CHANGE UP (ref 10–40)
BASOPHILS # BLD AUTO: 0.03 K/UL — SIGNIFICANT CHANGE UP (ref 0–0.2)
BASOPHILS # BLD AUTO: 0.03 K/UL — SIGNIFICANT CHANGE UP (ref 0–0.2)
BASOPHILS # BLD AUTO: 0.06 K/UL — SIGNIFICANT CHANGE UP (ref 0–0.2)
BASOPHILS # BLD AUTO: 0.06 K/UL — SIGNIFICANT CHANGE UP (ref 0–0.2)
BASOPHILS NFR BLD AUTO: 0.3 % — SIGNIFICANT CHANGE UP (ref 0–2)
BASOPHILS NFR BLD AUTO: 0.3 % — SIGNIFICANT CHANGE UP (ref 0–2)
BASOPHILS NFR BLD AUTO: 0.4 % — SIGNIFICANT CHANGE UP (ref 0–2)
BASOPHILS NFR BLD AUTO: 0.4 % — SIGNIFICANT CHANGE UP (ref 0–2)
BILIRUB DIRECT SERPL-MCNC: 0.2 MG/DL — SIGNIFICANT CHANGE UP (ref 0–0.3)
BILIRUB DIRECT SERPL-MCNC: 0.2 MG/DL — SIGNIFICANT CHANGE UP (ref 0–0.3)
BILIRUB INDIRECT FLD-MCNC: 0.8 MG/DL — SIGNIFICANT CHANGE UP (ref 0.2–1)
BILIRUB INDIRECT FLD-MCNC: 0.8 MG/DL — SIGNIFICANT CHANGE UP (ref 0.2–1)
BILIRUB SERPL-MCNC: 0.8 MG/DL — SIGNIFICANT CHANGE UP (ref 0.2–1.2)
BILIRUB SERPL-MCNC: 0.8 MG/DL — SIGNIFICANT CHANGE UP (ref 0.2–1.2)
BILIRUB SERPL-MCNC: 1 MG/DL — SIGNIFICANT CHANGE UP (ref 0.2–1.2)
BILIRUB SERPL-MCNC: 1 MG/DL — SIGNIFICANT CHANGE UP (ref 0.2–1.2)
BUN SERPL-MCNC: 13 MG/DL — SIGNIFICANT CHANGE UP (ref 7–23)
BUN SERPL-MCNC: 13 MG/DL — SIGNIFICANT CHANGE UP (ref 7–23)
BUN SERPL-MCNC: 19 MG/DL — SIGNIFICANT CHANGE UP (ref 7–23)
BUN SERPL-MCNC: 19 MG/DL — SIGNIFICANT CHANGE UP (ref 7–23)
CALCIUM SERPL-MCNC: 8.9 MG/DL — SIGNIFICANT CHANGE UP (ref 8.4–10.5)
CALCIUM SERPL-MCNC: 8.9 MG/DL — SIGNIFICANT CHANGE UP (ref 8.4–10.5)
CALCIUM SERPL-MCNC: 9.5 MG/DL — SIGNIFICANT CHANGE UP (ref 8.4–10.5)
CALCIUM SERPL-MCNC: 9.5 MG/DL — SIGNIFICANT CHANGE UP (ref 8.4–10.5)
CHLORIDE SERPL-SCNC: 100 MMOL/L — SIGNIFICANT CHANGE UP (ref 96–108)
CHLORIDE SERPL-SCNC: 100 MMOL/L — SIGNIFICANT CHANGE UP (ref 96–108)
CHLORIDE SERPL-SCNC: 106 MMOL/L — SIGNIFICANT CHANGE UP (ref 96–108)
CHLORIDE SERPL-SCNC: 106 MMOL/L — SIGNIFICANT CHANGE UP (ref 96–108)
CO2 SERPL-SCNC: 25 MMOL/L — SIGNIFICANT CHANGE UP (ref 22–31)
CO2 SERPL-SCNC: 25 MMOL/L — SIGNIFICANT CHANGE UP (ref 22–31)
CO2 SERPL-SCNC: 26 MMOL/L — SIGNIFICANT CHANGE UP (ref 22–31)
CO2 SERPL-SCNC: 26 MMOL/L — SIGNIFICANT CHANGE UP (ref 22–31)
CREAT SERPL-MCNC: 0.89 MG/DL — SIGNIFICANT CHANGE UP (ref 0.5–1.3)
CREAT SERPL-MCNC: 0.89 MG/DL — SIGNIFICANT CHANGE UP (ref 0.5–1.3)
CREAT SERPL-MCNC: 0.92 MG/DL — SIGNIFICANT CHANGE UP (ref 0.5–1.3)
CREAT SERPL-MCNC: 0.92 MG/DL — SIGNIFICANT CHANGE UP (ref 0.5–1.3)
EGFR: 107 ML/MIN/1.73M2 — SIGNIFICANT CHANGE UP
EGFR: 107 ML/MIN/1.73M2 — SIGNIFICANT CHANGE UP
EGFR: 110 ML/MIN/1.73M2 — SIGNIFICANT CHANGE UP
EGFR: 110 ML/MIN/1.73M2 — SIGNIFICANT CHANGE UP
EOSINOPHIL # BLD AUTO: 0.03 K/UL — SIGNIFICANT CHANGE UP (ref 0–0.5)
EOSINOPHIL # BLD AUTO: 0.03 K/UL — SIGNIFICANT CHANGE UP (ref 0–0.5)
EOSINOPHIL # BLD AUTO: 0.08 K/UL — SIGNIFICANT CHANGE UP (ref 0–0.5)
EOSINOPHIL # BLD AUTO: 0.08 K/UL — SIGNIFICANT CHANGE UP (ref 0–0.5)
EOSINOPHIL NFR BLD AUTO: 0.3 % — SIGNIFICANT CHANGE UP (ref 0–6)
EOSINOPHIL NFR BLD AUTO: 0.3 % — SIGNIFICANT CHANGE UP (ref 0–6)
EOSINOPHIL NFR BLD AUTO: 0.5 % — SIGNIFICANT CHANGE UP (ref 0–6)
EOSINOPHIL NFR BLD AUTO: 0.5 % — SIGNIFICANT CHANGE UP (ref 0–6)
GLUCOSE SERPL-MCNC: 112 MG/DL — HIGH (ref 70–99)
GLUCOSE SERPL-MCNC: 112 MG/DL — HIGH (ref 70–99)
GLUCOSE SERPL-MCNC: 132 MG/DL — HIGH (ref 70–99)
GLUCOSE SERPL-MCNC: 132 MG/DL — HIGH (ref 70–99)
HCT VFR BLD CALC: 46.3 % — SIGNIFICANT CHANGE UP (ref 39–50)
HCT VFR BLD CALC: 46.3 % — SIGNIFICANT CHANGE UP (ref 39–50)
HCT VFR BLD CALC: 49.2 % — SIGNIFICANT CHANGE UP (ref 39–50)
HCT VFR BLD CALC: 49.2 % — SIGNIFICANT CHANGE UP (ref 39–50)
HGB BLD-MCNC: 16.6 G/DL — SIGNIFICANT CHANGE UP (ref 13–17)
HGB BLD-MCNC: 16.6 G/DL — SIGNIFICANT CHANGE UP (ref 13–17)
HGB BLD-MCNC: 17.3 G/DL — HIGH (ref 13–17)
HGB BLD-MCNC: 17.3 G/DL — HIGH (ref 13–17)
IMM GRANULOCYTES NFR BLD AUTO: 0.3 % — SIGNIFICANT CHANGE UP (ref 0–0.9)
IMM GRANULOCYTES NFR BLD AUTO: 0.3 % — SIGNIFICANT CHANGE UP (ref 0–0.9)
IMM GRANULOCYTES NFR BLD AUTO: 0.5 % — SIGNIFICANT CHANGE UP (ref 0–0.9)
IMM GRANULOCYTES NFR BLD AUTO: 0.5 % — SIGNIFICANT CHANGE UP (ref 0–0.9)
LIDOCAIN IGE QN: 54 U/L — SIGNIFICANT CHANGE UP (ref 16–77)
LIDOCAIN IGE QN: 54 U/L — SIGNIFICANT CHANGE UP (ref 16–77)
LYMPHOCYTES # BLD AUTO: 0.82 K/UL — LOW (ref 1–3.3)
LYMPHOCYTES # BLD AUTO: 0.82 K/UL — LOW (ref 1–3.3)
LYMPHOCYTES # BLD AUTO: 1.37 K/UL — SIGNIFICANT CHANGE UP (ref 1–3.3)
LYMPHOCYTES # BLD AUTO: 1.37 K/UL — SIGNIFICANT CHANGE UP (ref 1–3.3)
LYMPHOCYTES # BLD AUTO: 6.9 % — LOW (ref 13–44)
LYMPHOCYTES # BLD AUTO: 6.9 % — LOW (ref 13–44)
LYMPHOCYTES # BLD AUTO: 9.2 % — LOW (ref 13–44)
LYMPHOCYTES # BLD AUTO: 9.2 % — LOW (ref 13–44)
MCHC RBC-ENTMCNC: 30.1 PG — SIGNIFICANT CHANGE UP (ref 27–34)
MCHC RBC-ENTMCNC: 30.1 PG — SIGNIFICANT CHANGE UP (ref 27–34)
MCHC RBC-ENTMCNC: 30.4 PG — SIGNIFICANT CHANGE UP (ref 27–34)
MCHC RBC-ENTMCNC: 30.4 PG — SIGNIFICANT CHANGE UP (ref 27–34)
MCHC RBC-ENTMCNC: 35.2 GM/DL — SIGNIFICANT CHANGE UP (ref 32–36)
MCHC RBC-ENTMCNC: 35.2 GM/DL — SIGNIFICANT CHANGE UP (ref 32–36)
MCHC RBC-ENTMCNC: 35.9 GM/DL — SIGNIFICANT CHANGE UP (ref 32–36)
MCHC RBC-ENTMCNC: 35.9 GM/DL — SIGNIFICANT CHANGE UP (ref 32–36)
MCV RBC AUTO: 84.8 FL — SIGNIFICANT CHANGE UP (ref 80–100)
MCV RBC AUTO: 84.8 FL — SIGNIFICANT CHANGE UP (ref 80–100)
MCV RBC AUTO: 85.6 FL — SIGNIFICANT CHANGE UP (ref 80–100)
MCV RBC AUTO: 85.6 FL — SIGNIFICANT CHANGE UP (ref 80–100)
MONOCYTES # BLD AUTO: 0.55 K/UL — SIGNIFICANT CHANGE UP (ref 0–0.9)
MONOCYTES # BLD AUTO: 0.55 K/UL — SIGNIFICANT CHANGE UP (ref 0–0.9)
MONOCYTES # BLD AUTO: 1.14 K/UL — HIGH (ref 0–0.9)
MONOCYTES # BLD AUTO: 1.14 K/UL — HIGH (ref 0–0.9)
MONOCYTES NFR BLD AUTO: 3.7 % — SIGNIFICANT CHANGE UP (ref 2–14)
MONOCYTES NFR BLD AUTO: 3.7 % — SIGNIFICANT CHANGE UP (ref 2–14)
MONOCYTES NFR BLD AUTO: 9.6 % — SIGNIFICANT CHANGE UP (ref 2–14)
MONOCYTES NFR BLD AUTO: 9.6 % — SIGNIFICANT CHANGE UP (ref 2–14)
NEUTROPHILS # BLD AUTO: 12.7 K/UL — HIGH (ref 1.8–7.4)
NEUTROPHILS # BLD AUTO: 12.7 K/UL — HIGH (ref 1.8–7.4)
NEUTROPHILS # BLD AUTO: 9.77 K/UL — HIGH (ref 1.8–7.4)
NEUTROPHILS # BLD AUTO: 9.77 K/UL — HIGH (ref 1.8–7.4)
NEUTROPHILS NFR BLD AUTO: 82.6 % — HIGH (ref 43–77)
NEUTROPHILS NFR BLD AUTO: 82.6 % — HIGH (ref 43–77)
NEUTROPHILS NFR BLD AUTO: 85.7 % — HIGH (ref 43–77)
NEUTROPHILS NFR BLD AUTO: 85.7 % — HIGH (ref 43–77)
NRBC # BLD: 0 /100 WBCS — SIGNIFICANT CHANGE UP (ref 0–0)
PLATELET # BLD AUTO: 238 K/UL — SIGNIFICANT CHANGE UP (ref 150–400)
PLATELET # BLD AUTO: 238 K/UL — SIGNIFICANT CHANGE UP (ref 150–400)
PLATELET # BLD AUTO: 241 K/UL — SIGNIFICANT CHANGE UP (ref 150–400)
PLATELET # BLD AUTO: 241 K/UL — SIGNIFICANT CHANGE UP (ref 150–400)
POTASSIUM SERPL-MCNC: 3.4 MMOL/L — LOW (ref 3.5–5.3)
POTASSIUM SERPL-MCNC: 3.4 MMOL/L — LOW (ref 3.5–5.3)
POTASSIUM SERPL-MCNC: 3.9 MMOL/L — SIGNIFICANT CHANGE UP (ref 3.5–5.3)
POTASSIUM SERPL-MCNC: 3.9 MMOL/L — SIGNIFICANT CHANGE UP (ref 3.5–5.3)
POTASSIUM SERPL-SCNC: 3.4 MMOL/L — LOW (ref 3.5–5.3)
POTASSIUM SERPL-SCNC: 3.4 MMOL/L — LOW (ref 3.5–5.3)
POTASSIUM SERPL-SCNC: 3.9 MMOL/L — SIGNIFICANT CHANGE UP (ref 3.5–5.3)
POTASSIUM SERPL-SCNC: 3.9 MMOL/L — SIGNIFICANT CHANGE UP (ref 3.5–5.3)
PROT SERPL-MCNC: 7.5 G/DL — SIGNIFICANT CHANGE UP (ref 6–8.3)
PROT SERPL-MCNC: 7.5 G/DL — SIGNIFICANT CHANGE UP (ref 6–8.3)
PROT SERPL-MCNC: 8.3 G/DL — SIGNIFICANT CHANGE UP (ref 6–8.3)
PROT SERPL-MCNC: 8.3 G/DL — SIGNIFICANT CHANGE UP (ref 6–8.3)
RBC # BLD: 5.46 M/UL — SIGNIFICANT CHANGE UP (ref 4.2–5.8)
RBC # BLD: 5.46 M/UL — SIGNIFICANT CHANGE UP (ref 4.2–5.8)
RBC # BLD: 5.75 M/UL — SIGNIFICANT CHANGE UP (ref 4.2–5.8)
RBC # BLD: 5.75 M/UL — SIGNIFICANT CHANGE UP (ref 4.2–5.8)
RBC # FLD: 11.9 % — SIGNIFICANT CHANGE UP (ref 10.3–14.5)
RBC # FLD: 11.9 % — SIGNIFICANT CHANGE UP (ref 10.3–14.5)
RBC # FLD: 12.1 % — SIGNIFICANT CHANGE UP (ref 10.3–14.5)
RBC # FLD: 12.1 % — SIGNIFICANT CHANGE UP (ref 10.3–14.5)
SODIUM SERPL-SCNC: 138 MMOL/L — SIGNIFICANT CHANGE UP (ref 135–145)
SODIUM SERPL-SCNC: 138 MMOL/L — SIGNIFICANT CHANGE UP (ref 135–145)
SODIUM SERPL-SCNC: 140 MMOL/L — SIGNIFICANT CHANGE UP (ref 135–145)
SODIUM SERPL-SCNC: 140 MMOL/L — SIGNIFICANT CHANGE UP (ref 135–145)
T3FREE SERPL-MCNC: 2.91 PG/ML — SIGNIFICANT CHANGE UP (ref 2–4.4)
T3FREE SERPL-MCNC: 2.91 PG/ML — SIGNIFICANT CHANGE UP (ref 2–4.4)
TROPONIN I, HIGH SENSITIVITY RESULT: 4.3 NG/L — SIGNIFICANT CHANGE UP
TROPONIN I, HIGH SENSITIVITY RESULT: 4.3 NG/L — SIGNIFICANT CHANGE UP
TROPONIN I, HIGH SENSITIVITY RESULT: 5 NG/L — SIGNIFICANT CHANGE UP
TROPONIN I, HIGH SENSITIVITY RESULT: 5 NG/L — SIGNIFICANT CHANGE UP
TROPONIN I, HIGH SENSITIVITY RESULT: <4 NG/L — SIGNIFICANT CHANGE UP
TROPONIN I, HIGH SENSITIVITY RESULT: <4 NG/L — SIGNIFICANT CHANGE UP
TSH SERPL-MCNC: 0.67 UIU/ML — SIGNIFICANT CHANGE UP (ref 0.36–3.74)
TSH SERPL-MCNC: 0.67 UIU/ML — SIGNIFICANT CHANGE UP (ref 0.36–3.74)
WBC # BLD: 11.83 K/UL — HIGH (ref 3.8–10.5)
WBC # BLD: 11.83 K/UL — HIGH (ref 3.8–10.5)
WBC # BLD: 14.83 K/UL — HIGH (ref 3.8–10.5)
WBC # BLD: 14.83 K/UL — HIGH (ref 3.8–10.5)
WBC # FLD AUTO: 11.83 K/UL — HIGH (ref 3.8–10.5)
WBC # FLD AUTO: 11.83 K/UL — HIGH (ref 3.8–10.5)
WBC # FLD AUTO: 14.83 K/UL — HIGH (ref 3.8–10.5)
WBC # FLD AUTO: 14.83 K/UL — HIGH (ref 3.8–10.5)

## 2023-12-10 PROCEDURE — 93010 ELECTROCARDIOGRAM REPORT: CPT | Mod: 59

## 2023-12-10 PROCEDURE — 99222 1ST HOSP IP/OBS MODERATE 55: CPT

## 2023-12-10 PROCEDURE — 99222 1ST HOSP IP/OBS MODERATE 55: CPT | Mod: GC

## 2023-12-10 PROCEDURE — 99223 1ST HOSP IP/OBS HIGH 75: CPT

## 2023-12-10 PROCEDURE — 76705 ECHO EXAM OF ABDOMEN: CPT | Mod: 26

## 2023-12-10 PROCEDURE — 71045 X-RAY EXAM CHEST 1 VIEW: CPT | Mod: 26

## 2023-12-10 PROCEDURE — 93306 TTE W/DOPPLER COMPLETE: CPT | Mod: 26

## 2023-12-10 PROCEDURE — 74177 CT ABD & PELVIS W/CONTRAST: CPT | Mod: 26,MA

## 2023-12-10 RX ORDER — POTASSIUM CHLORIDE 20 MEQ
10 PACKET (EA) ORAL ONCE
Refills: 0 | Status: COMPLETED | OUTPATIENT
Start: 2023-12-10 | End: 2023-12-10

## 2023-12-10 RX ORDER — POLYETHYLENE GLYCOL 3350 17 G/17G
17 POWDER, FOR SOLUTION ORAL DAILY
Refills: 0 | Status: DISCONTINUED | OUTPATIENT
Start: 2023-12-10 | End: 2023-12-12

## 2023-12-10 RX ORDER — TENOFOVIR DISOPROXIL FUMARATE 300 MG/1
300 TABLET, FILM COATED ORAL DAILY
Refills: 0 | Status: DISCONTINUED | OUTPATIENT
Start: 2023-12-10 | End: 2023-12-12

## 2023-12-10 RX ORDER — MORPHINE SULFATE 50 MG/1
4 CAPSULE, EXTENDED RELEASE ORAL ONCE
Refills: 0 | Status: DISCONTINUED | OUTPATIENT
Start: 2023-12-10 | End: 2023-12-10

## 2023-12-10 RX ORDER — PANTOPRAZOLE SODIUM 20 MG/1
40 TABLET, DELAYED RELEASE ORAL
Refills: 0 | Status: DISCONTINUED | OUTPATIENT
Start: 2023-12-10 | End: 2023-12-10

## 2023-12-10 RX ORDER — PANTOPRAZOLE SODIUM 20 MG/1
40 TABLET, DELAYED RELEASE ORAL ONCE
Refills: 0 | Status: COMPLETED | OUTPATIENT
Start: 2023-12-10 | End: 2023-12-10

## 2023-12-10 RX ORDER — LANOLIN ALCOHOL/MO/W.PET/CERES
3 CREAM (GRAM) TOPICAL AT BEDTIME
Refills: 0 | Status: DISCONTINUED | OUTPATIENT
Start: 2023-12-10 | End: 2023-12-12

## 2023-12-10 RX ORDER — INDOMETHACIN 50 MG
100 CAPSULE ORAL ONCE
Refills: 0 | Status: COMPLETED | OUTPATIENT
Start: 2023-12-11 | End: 2023-12-11

## 2023-12-10 RX ORDER — ACETAMINOPHEN 500 MG
650 TABLET ORAL EVERY 6 HOURS
Refills: 0 | Status: DISCONTINUED | OUTPATIENT
Start: 2023-12-10 | End: 2023-12-12

## 2023-12-10 RX ORDER — SODIUM CHLORIDE 9 MG/ML
1000 INJECTION INTRAMUSCULAR; INTRAVENOUS; SUBCUTANEOUS
Refills: 0 | Status: DISCONTINUED | OUTPATIENT
Start: 2023-12-10 | End: 2023-12-11

## 2023-12-10 RX ORDER — PANTOPRAZOLE SODIUM 20 MG/1
40 TABLET, DELAYED RELEASE ORAL DAILY
Refills: 0 | Status: DISCONTINUED | OUTPATIENT
Start: 2023-12-11 | End: 2023-12-12

## 2023-12-10 RX ORDER — SENNA PLUS 8.6 MG/1
2 TABLET ORAL AT BEDTIME
Refills: 0 | Status: DISCONTINUED | OUTPATIENT
Start: 2023-12-10 | End: 2023-12-12

## 2023-12-10 RX ORDER — TENOFOVIR DISOPROXIL FUMARATE 300 MG/1
1 TABLET, FILM COATED ORAL
Refills: 0 | DISCHARGE

## 2023-12-10 RX ORDER — ONDANSETRON 8 MG/1
4 TABLET, FILM COATED ORAL EVERY 8 HOURS
Refills: 0 | Status: DISCONTINUED | OUTPATIENT
Start: 2023-12-10 | End: 2023-12-12

## 2023-12-10 RX ORDER — TENOFOVIR DISOPROXIL FUMARATE 300 MG/1
1 TABLET, FILM COATED ORAL
Refills: 0
Start: 2023-12-10

## 2023-12-10 RX ORDER — PIPERACILLIN AND TAZOBACTAM 4; .5 G/20ML; G/20ML
3.38 INJECTION, POWDER, LYOPHILIZED, FOR SOLUTION INTRAVENOUS EVERY 8 HOURS
Refills: 0 | Status: DISCONTINUED | OUTPATIENT
Start: 2023-12-10 | End: 2023-12-12

## 2023-12-10 RX ADMIN — PIPERACILLIN AND TAZOBACTAM 25 GRAM(S): 4; .5 INJECTION, POWDER, LYOPHILIZED, FOR SOLUTION INTRAVENOUS at 21:44

## 2023-12-10 RX ADMIN — FAMOTIDINE 100 MILLIGRAM(S): 10 INJECTION INTRAVENOUS at 00:10

## 2023-12-10 RX ADMIN — MORPHINE SULFATE 4 MILLIGRAM(S): 50 CAPSULE, EXTENDED RELEASE ORAL at 00:10

## 2023-12-10 RX ADMIN — PANTOPRAZOLE SODIUM 40 MILLIGRAM(S): 20 TABLET, DELAYED RELEASE ORAL at 10:55

## 2023-12-10 RX ADMIN — SODIUM CHLORIDE 1000 MILLILITER(S): 9 INJECTION INTRAMUSCULAR; INTRAVENOUS; SUBCUTANEOUS at 00:10

## 2023-12-10 RX ADMIN — SODIUM CHLORIDE 80 MILLILITER(S): 9 INJECTION INTRAMUSCULAR; INTRAVENOUS; SUBCUTANEOUS at 07:37

## 2023-12-10 RX ADMIN — Medication 10 MILLIEQUIVALENT(S): at 11:57

## 2023-12-10 RX ADMIN — MORPHINE SULFATE 4 MILLIGRAM(S): 50 CAPSULE, EXTENDED RELEASE ORAL at 01:28

## 2023-12-10 RX ADMIN — SENNA PLUS 2 TABLET(S): 8.6 TABLET ORAL at 21:44

## 2023-12-10 RX ADMIN — TENOFOVIR DISOPROXIL FUMARATE 300 MILLIGRAM(S): 300 TABLET, FILM COATED ORAL at 11:57

## 2023-12-10 RX ADMIN — MORPHINE SULFATE 4 MILLIGRAM(S): 50 CAPSULE, EXTENDED RELEASE ORAL at 00:40

## 2023-12-10 RX ADMIN — MORPHINE SULFATE 4 MILLIGRAM(S): 50 CAPSULE, EXTENDED RELEASE ORAL at 00:58

## 2023-12-10 RX ADMIN — FAMOTIDINE 20 MILLIGRAM(S): 10 INJECTION INTRAVENOUS at 01:18

## 2023-12-10 RX ADMIN — PIPERACILLIN AND TAZOBACTAM 25 GRAM(S): 4; .5 INJECTION, POWDER, LYOPHILIZED, FOR SOLUTION INTRAVENOUS at 08:34

## 2023-12-10 RX ADMIN — PIPERACILLIN AND TAZOBACTAM 25 GRAM(S): 4; .5 INJECTION, POWDER, LYOPHILIZED, FOR SOLUTION INTRAVENOUS at 17:46

## 2023-12-10 RX ADMIN — SODIUM CHLORIDE 1000 MILLILITER(S): 9 INJECTION INTRAMUSCULAR; INTRAVENOUS; SUBCUTANEOUS at 01:18

## 2023-12-10 NOTE — H&P ADULT - PROBLEM SELECTOR PLAN 1
Epigastric pain, no radiation to back. Atypical.  -CT AP: Choledocholithiasis, with 3 mm calculus seen within the distal common bile duct. There is likely associated biliary obstruction as there is associated common bile duct and gallbladder dilatation. Question mild wall thickening of the gastric antrum which may be related to underdistention versus gastritis.  -US RUQ ordered  -GI consulted. Epigastric pain, no radiation to back. Atypical.  -CT AP: Choledocholithiasis, with 3 mm calculus seen within the distal common bile duct. There is likely associated biliary obstruction as there is associated common bile duct and gallbladder dilatation. Question mild wall thickening of the gastric antrum which may be related to underdistention versus gastritis.  -US RUQ ordered  -Hold starting abx as pt has no signs of possible infection. Begin if fever.  -GI consulted.  -keep NPO, until pending GI recs Epigastric pain, no radiation to back. Atypical.  -CT AP: Choledocholithiasis, with 3 mm calculus seen within the distal common bile duct. There is likely associated biliary obstruction as there is associated common bile duct and gallbladder dilatation. Question mild wall thickening of the gastric antrum which may be related to underdistention versus gastritis.  -US RUQ ordered  -Hold starting abx as pt has no signs of possible infection. Begin if fever.  -GI consulted.  -keep NPO, until abdominal sono. If no GI procedure today, restart on liquid diet.

## 2023-12-10 NOTE — CONSULT NOTE ADULT - SUBJECTIVE AND OBJECTIVE BOX
INTERVAL HPI/OVERNIGHT EVENTS:  HPI:  44 y/o male with h/o gastric ulcers, chronic Hep B, GERD, gallbladder polyp presented to ED c/o epigastric pain tonight. Pt states pain started at 6:30PM after dinner with associated chills. Pain worse with mylanta. Denies fevers, nausea, chest pain, vomiting, dysuria, melena, diarrhea, or pain radiating to back. Constipated today, but usually normal. Pt presented to ED for similar episodes on 12/8/23, 11/29/23, and  that resolved after taking maalox and pepcid.  In ED, afebrile, /84, HR 54, RR 18, spO2 99%. Labs remarkable for WBC 14.83, Hg 17.3, K+ 3.4, AST/ALT 27/56. CT A/P reveal Choledocholithiasis with 3 mm calculus seen within the distal common bile duct, likely associated biliary obstruction as there is associated common bile duct and gallbladder dilatation, Question mild wall thickening of the gastric antrum which may be related to underdistention versus gastritis. Probable constipation. Given 1L bolus NS, pepcid, morphine.      GI Consult Note: Patient seen and examined at bedside. C/O epigastric pain that started last night at 6pm. Patient states he had chills with the pain. Denies any nausea, vomiting, diarrhea, chest pain, fevers. Last bowel movement yesterday morning normal, denies any hematochezia, melena.  As per Patient he followed by GI doctor Lefty Pleitez:  EGD 12/28/22- Negative as per report in [facility] chart, however patient states he was told that he had ulcers  Colonoscopy 12/22- normal, it was done because patient has a family history of colon cancer  US abdominal - revealed 6mm polyp in gallbladder         MEDICATIONS  (STANDING):  pantoprazole    Tablet 40 milliGRAM(s) Oral before breakfast  piperacillin/tazobactam IVPB.. 3.375 Gram(s) IV Intermittent every 8 hours  potassium chloride    Tablet ER 10 milliEquivalent(s) Oral once  senna 2 Tablet(s) Oral at bedtime  sodium chloride 0.9%. 1000 milliLiter(s) (80 mL/Hr) IV Continuous <Continuous>  tenofovir disoproxil fumarate (VIREAD) 300 milliGRAM(s) Oral daily    MEDICATIONS  (PRN):  acetaminophen     Tablet .. 650 milliGRAM(s) Oral every 6 hours PRN Temp greater or equal to 38C (100.4F), Mild Pain (1 - 3)  aluminum hydroxide/magnesium hydroxide/simethicone Suspension 30 milliLiter(s) Oral every 4 hours PRN Dyspepsia  melatonin 3 milliGRAM(s) Oral at bedtime PRN Insomnia  ondansetron Injectable 4 milliGRAM(s) IV Push every 8 hours PRN Nausea and/or Vomiting  polyethylene glycol 3350 17 Gram(s) Oral daily PRN Constipation      Allergies    No Known Allergies    Intolerances        PAST MEDICAL & SURGICAL HISTORY:  Hepatitis B      GERD (gastroesophageal reflux disease)      Gallbladder polyp          REVIEW OF SYSTEMS    Negative Unless indicated in HPI        PHYSICAL EXAM:   Vital Signs:  Vital Signs Last 24 Hrs  T(C): 37.1 (10 Dec 2023 07:30), Max: 37.1 (10 Dec 2023 07:30)  T(F): 98.8 (10 Dec 2023 07:30), Max: 98.8 (10 Dec 2023 07:30)  HR: 79 (10 Dec 2023 07:30) (54 - 79)  BP: 112/70 (10 Dec 2023 07:30) (112/70 - 123/84)  BP(mean): --  RR: 16 (10 Dec 2023 07:30) (16 - 18)  SpO2: 96% (10 Dec 2023 07:30) (96% - 99%)    Parameters below as of 10 Dec 2023 07:30  Patient On (Oxygen Delivery Method): room air      Daily Height in cm: 167.64 (09 Dec 2023 22:30)    Daily I&O's Summary      GENERAL:  Appears stated age, well-groomed, well-nourished, no distress  HEENT:  NC/AT,  conjunctivae clear and pink,  CHEST:  Full & symmetric excursion, no increased effort, breath sounds clear  HEART:  Regular rhythm,   ABDOMEN:  Soft, non-tender, non-distended, normoactive bowel sounds,   EXTEREMITIES:  no cyanosis, clubbing or edema  SKIN:  warm/dry  NEURO:  Alert, oriented, no asterixis, no tremor, no encephalopathy      LABS:                        17.3   14.83 )-----------( 238      ( 10 Dec 2023 00:40 )             49.2     12-10    138  |  100  |  19  ----------------------------<  132<H>  3.4<L>   |  26  |  0.89    Ca    9.5      10 Dec 2023 00:40    TPro  8.3  /  Alb  4.5  /  TBili  0.8  /  DBili  x   /  AST  27  /  ALT  56<H>  /  AlkPhos  113  12-10      Urinalysis Basic - ( 10 Dec 2023 00:40 )    Color: x / Appearance: x / SG: x / pH: x  Gluc: 132 mg/dL / Ketone: x  / Bili: x / Urobili: x   Blood: x / Protein: x / Nitrite: x   Leuk Esterase: x / RBC: x / WBC x   Sq Epi: x / Non Sq Epi: x / Bacteria: x    Lipase: 54 U/L (12-10 @ 00:40)  Lipase: 47 U/L (12-08 @ 04:30)    RADIOLOGY & ADDITIONAL TESTS:  < from: CT Abdomen and Pelvis w/ IV Cont (12.10.23 @ 02:21) >  ACC: [id number] EXAM:  CT ABDOMEN AND PELVIS IC   ORDERED BY: RADHA DAVIDSON     PROCEDURE DATE:  12/10/2023          INTERPRETATION:  CLINICAL INFORMATION: Abdominal pain.    COMPARISON: 3/24/2023.    CONTRAST/COMPLICATIONS:  IV Contrast: Omnipaque 350  90 cc administered   10 cc discarded  Oral Contrast: NONE  Complications: None reported at time of study completion    PROCEDURE:  CT of the Abdomen and Pelvis was performed.  Sagittal and coronal reformats were performed.    FINDINGS:  LOWER CHEST: Dependent atelectasis is seen posteriorly.    LIVER: Within normal limits.  BILE DUCTS: There is a 3 mm stone seen within the distal common bile   duct. The common bile duct is mildly dilated measuring 8 mm..  GALLBLADDER: Gallbladder is dilated. There is a gallbladder polyp noted.  SPLEEN: Within normal limits.  PANCREAS: Within normal limits.  ADRENALS: Within normal limits.  KIDNEYS/URETERS: Within normal limits.    BLADDER: Within normal limits.  REPRODUCTIVE ORGANS: Prostate is mildly enlarged.    BOWEL: No bowel obstruction. Appendix is normal. There is a large stool   burden throughout the colon which may indicate constipation. There are   occasional colonic diverticula. No acute diverticulitis. Question wall   thickening of the gastric antrum versus underdistention.  PERITONEUM: No ascites.  VESSELS: Within normal limits.  RETROPERITONEUM/LYMPH NODES: No lymphadenopathy.  ABDOMINAL WALL: Within normal limits.  BONES: Multilevel degenerative changes..    IMPRESSION:  Choledocholithiasis, with 3 mm calculus seen within the distal common   bile duct. There is likely associated biliary obstruction as there is   associated common bile duct and gallbladder dilatation.    Question mild wall thickening of the gastric antrum which may be related   to underdistention versus gastritis. Correlate clinically.    Probable constipation.  --- End of Report ---   AMY VIEIRA M.D., Attending Radiologist  This document has been electronically signed. Dec 10 2023  3:08AM    < end of copied text >   INTERVAL HPI/OVERNIGHT EVENTS:  HPI:  42 y/o male with h/o gastric ulcers, chronic Hep B, GERD, gallbladder polyp presented to ED c/o epigastric pain tonight. Pt states pain started at 6:30PM after dinner with associated chills. Pain worse with mylanta. Denies fevers, nausea, chest pain, vomiting, dysuria, melena, diarrhea, or pain radiating to back. Constipated today, but usually normal. Pt presented to ED for similar episodes on 12/8/23, 11/29/23, and  that resolved after taking maalox and pepcid.  In ED, afebrile, /84, HR 54, RR 18, spO2 99%. Labs remarkable for WBC 14.83, Hg 17.3, K+ 3.4, AST/ALT 27/56. CT A/P reveal Choledocholithiasis with 3 mm calculus seen within the distal common bile duct, likely associated biliary obstruction as there is associated common bile duct and gallbladder dilatation, Question mild wall thickening of the gastric antrum which may be related to underdistention versus gastritis. Probable constipation. Given 1L bolus NS, pepcid, morphine.      GI Consult Note: Patient seen and examined at bedside. C/O epigastric pain that started last night at 6pm. Patient states he had chills with the pain. Denies any nausea, vomiting, diarrhea, chest pain, fevers. Last bowel movement yesterday morning normal, denies any hematochezia, melena.  As per Patient he followed by GI doctor Lefty Pleitez:  EGD 12/28/22- Negative as per report in [facility] chart, however patient states he was told that he had ulcers  Colonoscopy 12/22- normal, it was done because patient has a family history of colon cancer  US abdominal - revealed 6mm polyp in gallbladder         MEDICATIONS  (STANDING):  pantoprazole    Tablet 40 milliGRAM(s) Oral before breakfast  piperacillin/tazobactam IVPB.. 3.375 Gram(s) IV Intermittent every 8 hours  potassium chloride    Tablet ER 10 milliEquivalent(s) Oral once  senna 2 Tablet(s) Oral at bedtime  sodium chloride 0.9%. 1000 milliLiter(s) (80 mL/Hr) IV Continuous <Continuous>  tenofovir disoproxil fumarate (VIREAD) 300 milliGRAM(s) Oral daily    MEDICATIONS  (PRN):  acetaminophen     Tablet .. 650 milliGRAM(s) Oral every 6 hours PRN Temp greater or equal to 38C (100.4F), Mild Pain (1 - 3)  aluminum hydroxide/magnesium hydroxide/simethicone Suspension 30 milliLiter(s) Oral every 4 hours PRN Dyspepsia  melatonin 3 milliGRAM(s) Oral at bedtime PRN Insomnia  ondansetron Injectable 4 milliGRAM(s) IV Push every 8 hours PRN Nausea and/or Vomiting  polyethylene glycol 3350 17 Gram(s) Oral daily PRN Constipation      Allergies    No Known Allergies    Intolerances        PAST MEDICAL & SURGICAL HISTORY:  Hepatitis B      GERD (gastroesophageal reflux disease)      Gallbladder polyp          REVIEW OF SYSTEMS    Negative Unless indicated in HPI        PHYSICAL EXAM:   Vital Signs:  Vital Signs Last 24 Hrs  T(C): 37.1 (10 Dec 2023 07:30), Max: 37.1 (10 Dec 2023 07:30)  T(F): 98.8 (10 Dec 2023 07:30), Max: 98.8 (10 Dec 2023 07:30)  HR: 79 (10 Dec 2023 07:30) (54 - 79)  BP: 112/70 (10 Dec 2023 07:30) (112/70 - 123/84)  BP(mean): --  RR: 16 (10 Dec 2023 07:30) (16 - 18)  SpO2: 96% (10 Dec 2023 07:30) (96% - 99%)    Parameters below as of 10 Dec 2023 07:30  Patient On (Oxygen Delivery Method): room air      Daily Height in cm: 167.64 (09 Dec 2023 22:30)    Daily I&O's Summary      GENERAL:  Appears stated age, well-groomed, well-nourished, no distress  HEENT:  NC/AT,  conjunctivae clear and pink,  CHEST:  Full & symmetric excursion, no increased effort, breath sounds clear  HEART:  Regular rhythm,   ABDOMEN:  Soft, non-tender, non-distended, normoactive bowel sounds,   EXTEREMITIES:  no cyanosis, clubbing or edema  SKIN:  warm/dry  NEURO:  Alert, oriented, no asterixis, no tremor, no encephalopathy      LABS:                        17.3   14.83 )-----------( 238      ( 10 Dec 2023 00:40 )             49.2     12-10    138  |  100  |  19  ----------------------------<  132<H>  3.4<L>   |  26  |  0.89    Ca    9.5      10 Dec 2023 00:40    TPro  8.3  /  Alb  4.5  /  TBili  0.8  /  DBili  x   /  AST  27  /  ALT  56<H>  /  AlkPhos  113  12-10      Urinalysis Basic - ( 10 Dec 2023 00:40 )    Color: x / Appearance: x / SG: x / pH: x  Gluc: 132 mg/dL / Ketone: x  / Bili: x / Urobili: x   Blood: x / Protein: x / Nitrite: x   Leuk Esterase: x / RBC: x / WBC x   Sq Epi: x / Non Sq Epi: x / Bacteria: x    Lipase: 54 U/L (12-10 @ 00:40)  Lipase: 47 U/L (12-08 @ 04:30)    RADIOLOGY & ADDITIONAL TESTS:  < from: CT Abdomen and Pelvis w/ IV Cont (12.10.23 @ 02:21) >  ACC: [id number] EXAM:  CT ABDOMEN AND PELVIS IC   ORDERED BY: RADHA DAVIDSON     PROCEDURE DATE:  12/10/2023          INTERPRETATION:  CLINICAL INFORMATION: Abdominal pain.    COMPARISON: 3/24/2023.    CONTRAST/COMPLICATIONS:  IV Contrast: Omnipaque 350  90 cc administered   10 cc discarded  Oral Contrast: NONE  Complications: None reported at time of study completion    PROCEDURE:  CT of the Abdomen and Pelvis was performed.  Sagittal and coronal reformats were performed.    FINDINGS:  LOWER CHEST: Dependent atelectasis is seen posteriorly.    LIVER: Within normal limits.  BILE DUCTS: There is a 3 mm stone seen within the distal common bile   duct. The common bile duct is mildly dilated measuring 8 mm..  GALLBLADDER: Gallbladder is dilated. There is a gallbladder polyp noted.  SPLEEN: Within normal limits.  PANCREAS: Within normal limits.  ADRENALS: Within normal limits.  KIDNEYS/URETERS: Within normal limits.    BLADDER: Within normal limits.  REPRODUCTIVE ORGANS: Prostate is mildly enlarged.    BOWEL: No bowel obstruction. Appendix is normal. There is a large stool   burden throughout the colon which may indicate constipation. There are   occasional colonic diverticula. No acute diverticulitis. Question wall   thickening of the gastric antrum versus underdistention.  PERITONEUM: No ascites.  VESSELS: Within normal limits.  RETROPERITONEUM/LYMPH NODES: No lymphadenopathy.  ABDOMINAL WALL: Within normal limits.  BONES: Multilevel degenerative changes..    IMPRESSION:  Choledocholithiasis, with 3 mm calculus seen within the distal common   bile duct. There is likely associated biliary obstruction as there is   associated common bile duct and gallbladder dilatation.    Question mild wall thickening of the gastric antrum which may be related   to underdistention versus gastritis. Correlate clinically.    Probable constipation.  --- End of Report ---   AMY VIEIRA M.D., Attending Radiologist  This document has been electronically signed. Dec 10 2023  3:08AM    < end of copied text >   Schizoaffective disorder

## 2023-12-10 NOTE — PROGRESS NOTE ADULT - SUBJECTIVE AND OBJECTIVE BOX
Patient is a 43y old  Male who presents with a chief complaint of choledocholithiasis (10 Dec 2023 09:03)      Subjective and overnight events:  Patient seen and examined at bedside. no fever, chills, sob, cp. + mild epigastric pain on palpation but no pain at rest, no nasuea/vomitign.     ALLERGIES:  No Known Allergies    MEDICATIONS  (STANDING):  piperacillin/tazobactam IVPB.. 3.375 Gram(s) IV Intermittent every 8 hours  potassium chloride    Tablet ER 10 milliEquivalent(s) Oral once  senna 2 Tablet(s) Oral at bedtime  sodium chloride 0.9%. 1000 milliLiter(s) (80 mL/Hr) IV Continuous <Continuous>  tenofovir disoproxil fumarate (VIREAD) 300 milliGRAM(s) Oral daily    MEDICATIONS  (PRN):  acetaminophen     Tablet .. 650 milliGRAM(s) Oral every 6 hours PRN Temp greater or equal to 38C (100.4F), Mild Pain (1 - 3)  aluminum hydroxide/magnesium hydroxide/simethicone Suspension 30 milliLiter(s) Oral every 4 hours PRN Dyspepsia  melatonin 3 milliGRAM(s) Oral at bedtime PRN Insomnia  ondansetron Injectable 4 milliGRAM(s) IV Push every 8 hours PRN Nausea and/or Vomiting  polyethylene glycol 3350 17 Gram(s) Oral daily PRN Constipation    Vital Signs Last 24 Hrs  T(F): 98.1 (10 Dec 2023 11:28), Max: 98.8 (10 Dec 2023 07:30)  HR: 68 (10 Dec 2023 11:28) (54 - 79)  BP: 101/63 (10 Dec 2023 11:28) (101/63 - 123/84)  RR: 10 (10 Dec 2023 11:28) (10 - 18)  SpO2: 94% (10 Dec 2023 11:28) (94% - 99%)  I&O's Summary    PHYSICAL EXAM:  General: NAD, A/O x 3  ENT: MMM  Neck: Supple, No JVD  Lungs: Clear to auscultation bilaterally  Cardio: RRR, S1/S2, No murmurs  Abdomen: Soft, mild epigastric tenderness, Nondistended; Bowel sounds present  Extremities: No calf tenderness, No pitting edema    LABS:                        16.6   11.83 )-----------( 241      ( 10 Dec 2023 08:56 )             46.3     12-10    140  |  106  |  13  ----------------------------<  112  3.9   |  25  |  0.92    Ca    8.9      10 Dec 2023 08:56    TPro  7.5  /  Alb  4.0  /  TBili  1.0  /  DBili  0.2  /  AST  28  /  ALT  56  /  AlkPhos  108  12-10        CARDIAC MARKERS ( 10 Dec 2023 04:54 )  x     / 5.0 ng/L / x     / x     / x      CARDIAC MARKERS ( 10 Dec 2023 00:40 )  x     / <4.0 ng/L / x     / x     / x            Urinalysis Basic - ( 10 Dec 2023 08:56 )    Color: x / Appearance: x / SG: x / pH: x  Gluc: 112 mg/dL / Ketone: x  / Bili: x / Urobili: x   Blood: x / Protein: x / Nitrite: x   Leuk Esterase: x / RBC: x / WBC x   Sq Epi: x / Non Sq Epi: x / Bacteria: x            RADIOLOGY & ADDITIONAL TESTS:    Care Discussed with Consultants/Other Providers:

## 2023-12-10 NOTE — CONSULT NOTE ADULT - SUBJECTIVE AND OBJECTIVE BOX
HPI:  This is a very pleasant 43M w/ a PMH significant for hepatitis B, gastric ulcers (s/p reported treatment for H pylori), gall bladder polyps who presented with epigastric pain which started last night with associated chills. Workup was consistent with choledocholithiasis. He has had similar pain in the past, but not to this severity. The pain has now resolved. No nausea, vomiting, jaundice or other concerning symptoms. He presented two other times on 12/8/23 and 11/29/23 with similar symptoms.     PMH/PSH:  hepatitis B, gastric ulcers (s/p reported treatment for H pylori), gall bladder polyps  NKDA  Meds: tenofovir  Fam hx: Noncontributory  Social: no smoking, ETOH or drug use    ROS: A 10 point ROS was performed for which the pertinent positives and negatives were noted in the HPI. All others were reviewed and were negative    Vitals:  T 98.1 HR 68 /62 RR 16 O2 sat 96%  General appearance: No acute distress  HEENT: Normocephalic, atraumatic  Respiratory: Nonlabored breathing  Abdomen: soft, non-tender, non-distended, no cortez's, rebound or guarding  Extremities: No edema  Neuro: No focal deficits appreciated  Psych: Calm affect    Labs:  WBC 11 Hb 16 BUN/Cr 13/0.93  T bili 1.0    RUQ US: sludge, polyp with wall thickening    CT abdomen/pelvis:   Choledocholithiasis, with 3 mm calculus seen within the distal common bile duct

## 2023-12-10 NOTE — H&P ADULT - ASSESSMENT
42 y/o male with h/o gastric ulcers, chronic Hep B, GERD, gallbladder polyp presented to ED c/o epigastric pain tonight. choledocholithiasis 44 y/o male with h/o gastric ulcers, chronic Hep B, GERD, gallbladder polyp presented to ED c/o epigastric pain tonight. choledocholithiasis 44 y/o male with h/o gastric ulcers, chronic Hep B, GERD, gallbladder polyp presented to ED c/o epigastric pain, admitted for choledocholithiasis

## 2023-12-10 NOTE — H&P ADULT - PROBLEM SELECTOR PLAN 3
K+ 3.4  -K+ replete.  -continue to monitor. replete as needed Hx palpitations, went to cardio last week for 24hr holter monitoring, results pending.  -serial trops ordered  -EKG ordered  -continue to monitor Hx palpitations, pt states went to cardio last week for 24hr holter monitoring, results pending.  -serial trops ordered  -EKG ordered  -continue to monitor

## 2023-12-10 NOTE — CONSULT NOTE ADULT - NS ATTEND OPT1 GEN_ALL_CORE
I attest my time as attending is greater than 50% of the total combined time spent on qualifying patient care activities by the PA/NP and attending. [facility]: [phone]

## 2023-12-10 NOTE — ED PROVIDER NOTE - PHYSICAL EXAMINATION
General:     severe distress, yelling and crying out loud due to pain, writhing  Eyes: PERRL, white sclera  Head:     NC/AT, EOMI  Lungs:     CTA b/l  CVS:     RRR  Abd:     unable to assess bowel sounds. Soft and tender midepigastric and ruq. Non distended. No CVA tenderness.   Ext:   no deformities   Skin: no rash, diaphoretic  Neuro: AAOx3, no sensory/motor deficits

## 2023-12-10 NOTE — H&P ADULT - NSHPREVIEWOFSYSTEMS_GEN_ALL_CORE
REVIEW OF SYSTEMS:    CONSTITUTIONAL: No weakness, fevers. +chills  EYES/ENT: No visual changes;  No vertigo or throat pain   NECK: No pain or stiffness  RESPIRATORY: No cough, wheezing, hemoptysis; No shortness of breath  CARDIOVASCULAR: No chest pain or palpitations  GASTROINTESTINAL: +epigastric pain. No nausea, vomiting, or hematemesis; No diarrhea or constipation. No melena or hematochezia.  GENITOURINARY: No dysuria, frequency or hematuria  NEUROLOGICAL: No numbness or weakness  SKIN: No itching, burning, rashes, or lesions   All other review of systems is negative unless indicated above.

## 2023-12-10 NOTE — CONSULT NOTE ADULT - SUBJECTIVE AND OBJECTIVE BOX
MIREILLE VALDEZ  [id number]      HPI:    Terry Ivy is a 43 year old man with past medical history of Chronic Hepatitis B, GERD, Gastric ulcers presented with epigastric pain.        ALLERGIES:  No Known Allergies      PAST MEDICAL & SURGICAL HISTORY:  Hepatitis B      GERD (gastroesophageal reflux disease)      Gallbladder polyp            CURRENT MEDICATIONS:  acetaminophen     Tablet .. 650 milliGRAM(s) Oral every 6 hours PRN  aluminum hydroxide/magnesium hydroxide/simethicone Suspension 30 milliLiter(s) Oral every 4 hours PRN  melatonin 3 milliGRAM(s) Oral at bedtime PRN  ondansetron Injectable 4 milliGRAM(s) IV Push every 8 hours PRN  piperacillin/tazobactam IVPB.. 3.375 Gram(s) IV Intermittent every 8 hours  polyethylene glycol 3350 17 Gram(s) Oral daily PRN  senna 2 Tablet(s) Oral at bedtime  sodium chloride 0.9%. 1000 milliLiter(s) IV Continuous <Continuous>  tenofovir disoproxil fumarate (VIREAD) 300 milliGRAM(s) Oral daily      SOCIAL HISTORY:      FAMILY HISTORY:  Family history of colon cancer in mother (Mother)    Family history of esophageal cancer (Father)        ROS:  All 10 systems reviewed and positives noted in HPI    OBJECTIVE:    VITAL SIGNS:  Vital Signs Last 24 Hrs  T(C): 36.7 (10 Dec 2023 11:28), Max: 37.1 (10 Dec 2023 07:30)  T(F): 98.1 (10 Dec 2023 11:28), Max: 98.8 (10 Dec 2023 07:30)  HR: 68 (10 Dec 2023 11:28) (54 - 79)  BP: 101/63 (10 Dec 2023 11:28) (101/63 - 123/84)  BP(mean): --  RR: 10 (10 Dec 2023 11:28) (10 - 18)  SpO2: 94% (10 Dec 2023 11:28) (94% - 99%)    Parameters below as of 10 Dec 2023 07:30  Patient On (Oxygen Delivery Method): room air        PHYSICAL EXAM:  General: well appearing, no distress  HEENT: sclera anicteric  Neck: supple, no carotid bruits b/l  CVS: JVP ~ 7 cm H20, RRR, s1, s2, no murmurs/rubs/gallops  Chest: unlabored respirations, clear to auscultation b/l  Abdomen: non-distended  Extremities: no lower extremity edema b/l  Neuro: awake, alert & oriented x 3  Psych: normal affect      LABS:                        16.6   11.83 )-----------( 241      ( 10 Dec 2023 08:56 )             46.3     12-10    140  |  106  |  13  ----------------------------<  112<H>  3.9   |  25  |  0.92    Ca    8.9      10 Dec 2023 08:56    TPro  7.5  /  Alb  4.0  /  TBili  1.0  /  DBili  0.2  /  AST  28  /  ALT  56<H>  /  AlkPhos  108  12-10          Outpatient TTE (10/2022):  Normal LV and RV systolic function    CT Heart (10/2022):  The calculated coronary artery calcium Agatston score is 0.      ECG (12/10/23): normal sinus rhythm, inferior Q waves, left axis deviation (similar to prior ECG)   MIREILLE VALDEZ  [id number]      HPI:    Terry Ivy is a 43 year old man with past medical history of Chronic Hepatitis B, GERD, Gastric ulcers presented with epigastric pain.        ALLERGIES:  No Known Allergies      PAST MEDICAL & SURGICAL HISTORY:  Hepatitis B      GERD (gastroesophageal reflux disease)      Gallbladder polyp            CURRENT MEDICATIONS:  acetaminophen     Tablet .. 650 milliGRAM(s) Oral every 6 hours PRN  aluminum hydroxide/magnesium hydroxide/simethicone Suspension 30 milliLiter(s) Oral every 4 hours PRN  melatonin 3 milliGRAM(s) Oral at bedtime PRN  ondansetron Injectable 4 milliGRAM(s) IV Push every 8 hours PRN  piperacillin/tazobactam IVPB.. 3.375 Gram(s) IV Intermittent every 8 hours  polyethylene glycol 3350 17 Gram(s) Oral daily PRN  senna 2 Tablet(s) Oral at bedtime  sodium chloride 0.9%. 1000 milliLiter(s) IV Continuous <Continuous>  tenofovir disoproxil fumarate (VIREAD) 300 milliGRAM(s) Oral daily      SOCIAL HISTORY:      FAMILY HISTORY:  Family history of colon cancer in mother (Mother)    Family history of esophageal cancer (Father)        ROS:  All 10 systems reviewed and positives noted in HPI    OBJECTIVE:    VITAL SIGNS:  Vital Signs Last 24 Hrs  T(C): 36.7 (10 Dec 2023 11:28), Max: 37.1 (10 Dec 2023 07:30)  T(F): 98.1 (10 Dec 2023 11:28), Max: 98.8 (10 Dec 2023 07:30)  HR: 68 (10 Dec 2023 11:28) (54 - 79)  BP: 101/63 (10 Dec 2023 11:28) (101/63 - 123/84)  BP(mean): --  RR: 10 (10 Dec 2023 11:28) (10 - 18)  SpO2: 94% (10 Dec 2023 11:28) (94% - 99%)    Parameters below as of 10 Dec 2023 07:30  Patient On (Oxygen Delivery Method): room air        PHYSICAL EXAM:  General: well appearing, no distress  HEENT: sclera anicteric  Neck: supple, no carotid bruits b/l  CVS: JVP ~ 7 cm H20, RRR, s1, s2, no murmurs/rubs/gallops  Chest: unlabored respirations, clear to auscultation b/l  Abdomen: non-distended  Extremities: no lower extremity edema b/l  Neuro: awake, alert & oriented x 3  Psych: normal affect      LABS:                        16.6   11.83 )-----------( 241      ( 10 Dec 2023 08:56 )             46.3     12-10    140  |  106  |  13  ----------------------------<  112<H>  3.9   |  25  |  0.92    Ca    8.9      10 Dec 2023 08:56    TPro  7.5  /  Alb  4.0  /  TBili  1.0  /  DBili  0.2  /  AST  28  /  ALT  56<H>  /  AlkPhos  108  12-10          Outpatient TTE (10/2022):  Normal LV and RV systolic function    CT Heart (10/2022):  The calculated coronary artery calcium Agatston score is 0.      ECG (12/10/23): normal sinus rhythm, inferior Q waves, left axis deviation (similar to prior ECG)   MIREILLE VALDEZ  [id number]      HPI:    Terry Ivy is a 43 year old man with past medical history of Chronic Hepatitis B, GERD, Gastric ulcers presented with epigastric pain.    The patient reports abdominal discomfort. He denies exertional chest pain or shortness of breath. He reports history of occasional, mild brief palpitations for over one year now.       ALLERGIES:  No Known Allergies      PAST MEDICAL & SURGICAL HISTORY:  Hepatitis B  GERD (gastroesophageal reflux disease)  Gallbladder polyp      CURRENT MEDICATIONS:  acetaminophen     Tablet .. 650 milliGRAM(s) Oral every 6 hours PRN  aluminum hydroxide/magnesium hydroxide/simethicone Suspension 30 milliLiter(s) Oral every 4 hours PRN  melatonin 3 milliGRAM(s) Oral at bedtime PRN  ondansetron Injectable 4 milliGRAM(s) IV Push every 8 hours PRN  piperacillin/tazobactam IVPB.. 3.375 Gram(s) IV Intermittent every 8 hours  polyethylene glycol 3350 17 Gram(s) Oral daily PRN  senna 2 Tablet(s) Oral at bedtime  sodium chloride 0.9%. 1000 milliLiter(s) IV Continuous <Continuous>  tenofovir disoproxil fumarate (VIREAD) 300 milliGRAM(s) Oral daily      ROS:  All 10 systems reviewed and positives noted in HPI    OBJECTIVE:    VITAL SIGNS:  Vital Signs Last 24 Hrs  T(C): 36.7 (10 Dec 2023 11:28), Max: 37.1 (10 Dec 2023 07:30)  T(F): 98.1 (10 Dec 2023 11:28), Max: 98.8 (10 Dec 2023 07:30)  HR: 68 (10 Dec 2023 11:28) (54 - 79)  BP: 101/63 (10 Dec 2023 11:28) (101/63 - 123/84)  BP(mean): --  RR: 10 (10 Dec 2023 11:28) (10 - 18)  SpO2: 94% (10 Dec 2023 11:28) (94% - 99%)    Parameters below as of 10 Dec 2023 07:30  Patient On (Oxygen Delivery Method): room air      PHYSICAL EXAM:  General: well appearing, no distress  HEENT: sclera anicteric  Neck: supple  CVS: JVP ~ 7 cm H20, RRR, s1, s2, no murmurs/rubs/gallops  Chest: unlabored respirations, clear to auscultation b/l  Abdomen: non-distended  Extremities: no lower extremity edema b/l  Neuro: awake, alert & oriented   Psych: normal affect      LABS:                        16.6   11.83 )-----------( 241      ( 10 Dec 2023 08:56 )             46.3     12-10    140  |  106  |  13  ----------------------------<  112<H>  3.9   |  25  |  0.92    Ca    8.9      10 Dec 2023 08:56    TPro  7.5  /  Alb  4.0  /  TBili  1.0  /  DBili  0.2  /  AST  28  /  ALT  56<H>  /  AlkPhos  108  12-10          Outpatient TTE (10/2022):  Normal LV and RV systolic function    CT Heart (10/2022):  The calculated coronary artery calcium Agatston score is 0.    ECG (12/10/23): normal sinus rhythm, inferior Q waves, left axis deviation (similar to prior ECG)   MIREILLE VALDEZ  [id number]      HPI:    Terry Ivy is a 43 year old man with past medical history of Chronic Hepatitis B, GERD, Gastric ulcers presented with epigastric pain.    The patient reports abdominal discomfort. He denies exertional chest pain or shortness of breath. He reports history of occasional, mild brief palpitations for over one year now.       ALLERGIES:  No Known Allergies      PAST MEDICAL & SURGICAL HISTORY:  Hepatitis B  GERD (gastroesophageal reflux disease)  Gallbladder polyp      CURRENT MEDICATIONS:  acetaminophen     Tablet .. 650 milliGRAM(s) Oral every 6 hours PRN  aluminum hydroxide/magnesium hydroxide/simethicone Suspension 30 milliLiter(s) Oral every 4 hours PRN  melatonin 3 milliGRAM(s) Oral at bedtime PRN  ondansetron Injectable 4 milliGRAM(s) IV Push every 8 hours PRN  piperacillin/tazobactam IVPB.. 3.375 Gram(s) IV Intermittent every 8 hours  polyethylene glycol 3350 17 Gram(s) Oral daily PRN  senna 2 Tablet(s) Oral at bedtime  sodium chloride 0.9%. 1000 milliLiter(s) IV Continuous <Continuous>  tenofovir disoproxil fumarate (VIREAD) 300 milliGRAM(s) Oral daily      ROS:  All 10 systems reviewed and positives noted in HPI    OBJECTIVE:    VITAL SIGNS:  Vital Signs Last 24 Hrs  T(C): 36.7 (10 Dec 2023 11:28), Max: 37.1 (10 Dec 2023 07:30)  T(F): 98.1 (10 Dec 2023 11:28), Max: 98.8 (10 Dec 2023 07:30)  HR: 68 (10 Dec 2023 11:28) (54 - 79)  BP: 101/63 (10 Dec 2023 11:28) (101/63 - 123/84)  BP(mean): --  RR: 10 (10 Dec 2023 11:28) (10 - 18)  SpO2: 94% (10 Dec 2023 11:28) (94% - 99%)    Parameters below as of 10 Dec 2023 07:30  Patient On (Oxygen Delivery Method): room air      PHYSICAL EXAM:  General: well appearing, no distress  HEENT: sclera anicteric  Neck: supple  CVS: JVP ~ 7 cm H20, RRR, s1, s2, no murmurs/rubs/gallops  Chest: unlabored respirations, clear to auscultation b/l  Abdomen: non-distended  Extremities: no lower extremity edema b/l  Neuro: awake, alert & oriented   Psych: normal affect      LABS:                        16.6   11.83 )-----------( 241      ( 10 Dec 2023 08:56 )             46.3     12-10    140  |  106  |  13  ----------------------------<  112<H>  3.9   |  25  |  0.92    Ca    8.9      10 Dec 2023 08:56    TPro  7.5  /  Alb  4.0  /  TBili  1.0  /  DBili  0.2  /  AST  28  /  ALT  56<H>  /  AlkPhos  108  12-10          Outpatient TTE (10/2022):  Normal LV and RV systolic function    CT Heart (10/2022):  The calculated coronary artery calcium Agatston score is 0.    ECG (12/10/23): normal sinus rhythm, inferior Q waves, left axis deviation (similar to prior ECG)

## 2023-12-10 NOTE — H&P ADULT - NSHPPHYSICALEXAM_GEN_ALL_CORE
PHYSICAL EXAM:  GENERAL: NAD, lying in bed comfortably  HEAD:  Atraumatic, Normocephalic  EYES: EOMI, PERRLA, conjunctiva and sclera clear  ENT: Moist mucous membranes  NECK: Supple,   CHEST/LUNG: Clear to auscultation bilaterally; No rales, rhonchi, wheezing, or rubs. Unlabored respirations  HEART: Regular rate and rhythm; No murmurs, rubs, or gallops  ABDOMEN: Bowel sounds present; Soft, Nontender, Nondistended. No hepatomegaly.  EXTREMITIES:  No clubbing, cyanosis, or edema  NERVOUS SYSTEM:  Alert & Oriented X3, speech clear. No deficits   MSK: FROM all 4 extremities, full and equal strength  SKIN: No rashes or lesions

## 2023-12-10 NOTE — CONSULT NOTE ADULT - ASSESSMENT
This is a very pleasant 43M w/ a PMH significant for hepatitis B, gastric ulcers (s/p reported treatment for H pylori), gall bladder polyps who is here with choledocholithiasis. He is afebrile, hemodynamically stable with resolution of his pain.    - GI is consulted and plans for ERCP tomorrow.   - NPO, IVF.  - I discussed risks and benefits of surgery at length in both english and Mandarin, his native language. He is apprehensive about surgery and would like to potentially hold off and "wait for another episode of pain" prior to deciding on surgery. I explained that his prior episodes of pain are likely secondary to biliary colic and that there is a risk for acute cholecystitis, gallstone pancreatitis, recurrent choledocholithiasis in the future if he does not decide on surgical intervention. He understands the risks and would like to decline at this time. His family member was also at bedside. I told him I'd let him think about things and revisit potential surgery with him tomorrow.  - No plans for surgery at this time. He will go for ERCP on 12/11. If he does decide on surgery will attempt to schedule prior to his discharge ideally. Will touch base with his PCP tomorrow.  - Would recommend outpatient follow up with my office if he has any recurrence of abdominal pain to discuss robotic cholecystectomy if he does not wish to have it prior to discharge.  - Appreciate medical optimization for surgery in the event that he changes his mind.    Plan discussed with surgical PA, GI team (Dr. Alvarado) and the hospitalist (Dr. Cardozo).

## 2023-12-10 NOTE — CONSULT NOTE ADULT - CONSULT REASON
choledocholithiasis
Pre-operative cardiac risk stratification prior to ERCP and cholecystectomy
Choledocholithiasis

## 2023-12-10 NOTE — ED PROVIDER NOTE - OBJECTIVE STATEMENT
43-year-old male presents to the emergency department complaining of severe epigastric abdominal pain after a meal.  Patient states he had fried duck and another meat and shortly after began to have upper abdominal pain.  Patient reports that he was recently here for similar within the last 24 to 36 hours and had improvement after Pepcid however his pain quickly returned after eating today.  No fever nausea vomiting chest pain dysuria melena diarrhea or back pain.  No history of alcoholism.  Patient states he has history of gastric ulcers diagnosed since the age of 16, chronic hepatitis B, gallbladder polyp in the past.  Patient states he tried taking Maalox however it made the pain feel worse.

## 2023-12-10 NOTE — PROGRESS NOTE ADULT - ASSESSMENT
43 M hx of chronic Hep B, hx of GB polyp pw lower sternal epigastric pain after eating this evening with no associated fevers, chills, NVD. s/p Morphine 4mg in ED and currently min discomfort.       Related he saw cardiologist last week for workup of increased frequency of palps. had Holter and results pending.   In ED afebrile P: 54 BP: 123/84 sat 99% on RA.         CT abd and ithiasis, with 3 mm calculus seen within the distal common bile duct.    associated biliary obstruction as there is   associated common bile duct and gallbladder dilatation.    Question mild wall thickening of the gastric antrum which may be related   to underdistention versus gastritis. Correlate clinically.    Probable constipation.    AP:   43 M hx of chronic Hep B, hx of GB polyp pw lower sternal epigastric pain with choledolcholithiasis  # Choledocholithiasis.   some leukocytosis but afebrile and normal lfts and nontender at the moment.   hold antibxs for now with low threshold to start if fever, rising leukocytosis/LFTs  IVF  GI consult.   Keep NPO until  RUQ sono.   Nonreproducible sxs and pt mainly pointing in lower substernal region. trend trops x 3 to be complete.   #hep B  cont Viread.   #hx of gastritis and CT findings of prob gastritis.   PPI.   [facility] BENITEZ rev  D/W Dr Barnes ED 43 M hx of chronic Hep B, hx of GB polyp pw lower sternal epigastric pain after eating this evening with no associated fevers, chills, NVD. s/p Morphine 4mg in ED and currently min discomfort.       Related he saw cardiologist last week for workup of increased frequency of palps. had Holter and results pending.   In ED afebrile P: 54 BP: 123/84 sat 99% on RA.       # Choledocholithiasis.   CT abd and pelvis showed Choledocholithiasis some leukocytosis but afebrile and normal lfts and nontender at the moment.   hold antibxs for now with low threshold to start if fever, rising leukocytosis/LFTs  NPO   IVF   cont with empiric IV Zosyn   GI consult appreciated, keep NPO. tentative ERCP on monday. coordinate with surgery in the event cholecystectomy is needed'  Surgery consult     #hx of gastritis and CT findings of prob gastritis.   PPI.     #Palpitations  Related he saw cardiologist last week for workup of increased frequency of palps. had Holter and results pending.   EKG reviewed. NSR, q waves in inferior leads  check echo  cardiology consult for pre-op risks stratification     #Chronic Hep B  cont Viread.     DVT ppx: lovenox subq  43 M hx of chronic Hep B, hx of GB polyp pw lower sternal epigastric pain after eating this evening with no associated fevers, chills, NVD. s/p Morphine 4mg in ED and currently min discomfort.       Related he saw cardiologist last week for workup of increased frequency of palps. had Holter and results pending.   In ED afebrile P: 54 BP: 123/84 sat 99% on RA.       # Choledocholithiasis.   CT abd and pelvis showed Choledocholithiasis some leukocytosis but afebrile and normal lfts and nontender at the moment.   hold antibxs for now with low threshold to start if fever, rising leukocytosis/LFTs  NPO   IVF   cont with empiric IV Zosyn   GI consult appreciated, keep NPO. tentative ERCP on monday. coordinate with surgery in the event cholecystectomy is needed'  Surgery consult     #hx of gastritis and CT findings of prob gastritis.   PPI.     #Palpitations  Related he saw cardiologist last week for workup of increased frequency of palps. had Holter and results pending.   EKG reviewed. NSR, q waves in inferior leads  check echo  check TSH  cardiology consult for pre-op risks stratification     #Chronic Hep B  cont Viread.     DVT ppx: lovenox subq

## 2023-12-10 NOTE — CONSULT NOTE ADULT - ASSESSMENT
GI consult requested for 44 y/o male with Cholelithiasis, Patient has a PMH of gastric ulcers, chronic Hep B, GERD, gallbladder polyp presented to ED c/o epigastric pain.     Currently patient is clinically stable.  Abdominal CT impression:  Choledocholithiasis, with 3 mm calculus seen within the distal common   bile duct. There is likely associated biliary obstruction as there is   associated common bile duct and gallbladder dilatation.    Abdominal US of RUQ completed pending results    Of note:  CT A/P 3/2023: Cholelithiasis with small calcified gallstone in the neck of the gallbladder.  US RUQ 4/2023: Unchanged subcentimeter-sized gallbladder mural polyp 7mm and 5mm.   MRCP:7/21/23: No evidence of intra or extrahepatic biliary dilatation. Gallbladder polyp.

## 2023-12-10 NOTE — PATIENT PROFILE ADULT - FALL HARM RISK - UNIVERSAL INTERVENTIONS
Bed in lowest position, wheels locked, appropriate side rails in place/Call bell, personal items and telephone in reach/Instruct patient to call for assistance before getting out of bed or chair/Non-slip footwear when patient is out of bed/[place] to call system/Physically safe environment - no spills, clutter or unnecessary equipment/Purposeful Proactive Rounding/Room/bathroom lighting operational, light cord in reach Bed in lowest position, wheels locked, appropriate side rails in place/Call bell, personal items and telephone in reach/Instruct patient to call for assistance before getting out of bed or chair/Non-slip footwear when patient is out of bed/[place] to call system/Physically safe environment - no spills, clutter or unnecessary equipment/Purposeful Proactive Rounding/Room/bathroom lighting operational, light cord in reach

## 2023-12-10 NOTE — H&P ADULT - ATTENDING COMMENTS
I have personally seen and examined patient on the above date.  I discussed the case with Dr Dutta and I agree with findings and plan as detailed per note above, which I have amended where appropriate.    Superseding the above.  43 M hx of chronic Hep B, hx of GB polyp pw lower sternal epigastric pain after eating this evening with no associated fevers, chills, NVD. s/p Morphine 4mg in ED and currently min discomfort.   Related he saw cardiologist last week for workup of increased frequency of palps. had Holter and results pending.   In ED afebrile P: 54 BP: 123/84 sat 99% on RA.   PE:  NAD  CV: S1S2, RRR, no mgr  CL; CTA bl. no costochondral or xiphoid tenderness,   ABd: soft, no epigastric or RUQ tenderness appreciated. ND, +BS. no cva tenderness.   Ext; neg CCE.   labs:  WBC: 14.83 85%N K: 3.4 AST/ALT: 27/56 TB: 0.8   EKG: personally rev. NSRat 75bpm no acute ST changes. unchanged.   CXR: wet read. personally rev. NAPD  CTAP:  IMPRESSION:  Choledocholithiasis, with 3 mm calculus seen within the distal common   bile duct. There is likely associated biliary obstruction as there is   associated common bile duct and gallbladder dilatation.    Question mild wall thickening of the gastric antrum which may be related   to underdistention versus gastritis. Correlate clinically.    Probable constipation.    AP:   43 M hx of chronic Hep B, hx of GB polyp pw lower sternal epigastric pain with choledolcholithiasis  # Choledocholithiasis.   some leukocytosis but afebrile and normal lfts and nontender at the moment.   hold antibxs for now with low threshold to start if fever, rising leukocytosis/LFTs  IVF  GI consult.   Keep NPO until  RUQ sono.   Nonreproducible sxs and pt mainly pointing in lower substernal region. trend trops x 3 to be complete.   #hep B  cont Viread.   #hx of gastritis and CT findings of prob gastritis.   PPI.   [facility] ELI rev  D/W Dr Barnes ED I have personally seen and examined patient on the above date.  I discussed the case with Dr Dutta and I agree with findings and plan as detailed per note above, which I have amended where appropriate.    Superseding the above.  43 M hx of chronic Hep B, hx of GB polyp pw lower sternal epigastric pain after eating this evening with no associated fevers, chills, NVD. s/p Morphine 4mg in ED and currently min discomfort.   Related he saw cardiologist last week for workup of increased frequency of palps. had Holter and results pending.   In ED afebrile P: 54 BP: 123/84 sat 99% on RA.   PE:  NAD  CV: S1S2, RRR, no mgr  CL; CTA bl. no costochondral or xiphoid tenderness,   ABd: soft, no epigastric or RUQ tenderness appreciated. ND, +BS. no cva tenderness.   Ext; neg CCE.   labs:  WBC: 14.83 85%N K: 3.4 AST/ALT: 27/56 TB: 0.8   EKG: personally rev. NSRat 75bpm no acute ST changes. unchanged.   CXR: wet read. personally rev. NAPD  CTAP:  IMPRESSION:  Choledocholithiasis, with 3 mm calculus seen within the distal common   bile duct. There is likely associated biliary obstruction as there is   associated common bile duct and gallbladder dilatation.    Question mild wall thickening of the gastric antrum which may be related   to underdistention versus gastritis. Correlate clinically.    Probable constipation.    AP:   43 M hx of chronic Hep B, hx of GB polyp pw lower sternal epigastric pain with choledolcholithiasis  # Choledocholithiasis.   some leukocytosis but afebrile and normal lfts and nontender at the moment.   hold antibxs for now with low threshold to start if fever, rising leukocytosis/LFTs  IVF  GI consult.   Keep NPO until  RUQ sono.   Nonreproducible sxs and pt mainly pointing in lower substernal region. trend trops x 3 to be complete.   #hep B  cont Viread.   #hx of gastritis and CT findings of prob gastritis.   PPI.   [facility] ELI rev  D/W Dr Barnes ED

## 2023-12-10 NOTE — ED PROVIDER NOTE - NSICDXPASTMEDICALHX_GEN_ALL_CORE_FT
PAST MEDICAL HISTORY:  Gallbladder polyp     GERD (gastroesophageal reflux disease)     Hepatitis B

## 2023-12-10 NOTE — H&P ADULT - PROBLEM SELECTOR PLAN 5
Hx palpitations, went to cardio last week for 24hr holter monitoring, results pending.  -serial trops ordered  -EKG ordered  -continue to monitor -c/w home tenofovir

## 2023-12-10 NOTE — ED PROVIDER NOTE - CLINICAL SUMMARY MEDICAL DECISION MAKING FREE TEXT BOX
43-year-old male presents to the emergency department complaining of severe epigastric abdominal pain after a meal.  Patient states he had fried duck and another meat and shortly after began to have upper abdominal pain.  Patient reports that he was recently here for similar within the last 24 to 36 hours and had improvement after Pepcid however his pain quickly returned after eating today.  No fever nausea vomiting chest pain dysuria melena diarrhea or back pain.  No history of alcoholism.  Patient states he has history of gastric ulcers diagnosed since the age of 16, chronic hepatitis B, gallbladder polyp in the past.  Patient states he tried taking Maalox however it made the pain feel worse.  Exam as stated.   Plan for labs with ct abd. Pain control. Reassess.     Pt pain controlled with morphine pepcid. Pt refused zofran. No nausea.     CT with choledocholithiasis. D/W Dr Ann for admission.

## 2023-12-10 NOTE — CONSULT NOTE ADULT - ASSESSMENT
Assessment:  Terry Ivy is a 43 year old man with past medical history of Chronic Hepatitis B, GERD, Gastric ulcers presented with epigastric pain, found to have choledocholithiasis and calculus within the distal common bile duct with possible biliary obstruction.    Cardiology consulted for pre-operative cardiac risk stratification prior to ERCP and cholecystectomy.       Assessment:  Terry Ivy is a 43 year old man with past medical history of Chronic Hepatitis B, GERD, Gastric ulcers presented with epigastric pain, found to have choledocholithiasis and calculus within the distal common bile duct with possible biliary obstruction.    Cardiology consulted for pre-operative cardiac risk stratification prior to ERCP and cholecystectomy. ECG consistent with normal sinus rhythm, inferior Q waves/borderline nondiagnostic and left axis deviation, similar to prior ECG, no acute ischemic ST abnormalities. Troponins negative x 3, patient denies angina or dyspnea, there are no signs of an acute coronary syndrome. In addition, CT heart from 10/2022 with zero calcium score and no CAD. Echo from 10/2022 with normal LV and RV systolic function.    Recommendations:  [] Pre-operative cardiac risk stratification: The patient is at low cardiovascular risk prior to ERCP and cholecystectomy, may proceed at this level of risk, no further cardiac testing required prior to procedures. Would place patient on telemetry due to history of palpitations.     Discussed with surgeon, Dr. Dozier. Updated hospitalist, Dr. Cardozo. We will continue to follow along.    Shahzad Burrell MD  Cardiology

## 2023-12-10 NOTE — ED PROVIDER NOTE - NSICDXFAMILYHX_GEN_ALL_CORE_FT
FAMILY HISTORY:  Father  Still living? Yes, Estimated age: Age Unknown  Family history of esophageal cancer, Age at diagnosis: Age Unknown    Mother  Still living? Unknown  Family history of colon cancer in mother, Age at diagnosis: Age Unknown

## 2023-12-10 NOTE — CONSULT NOTE ADULT - PROBLEM SELECTOR RECOMMENDATION 9
NPO  IV fluids  Pain management  PPI  Antibiotics   Monitor labs LFTs:  Bili- normal at present.  AST-normal  ALT slightly elevated 56   Pending results of RUQ US  Possible course of action, Plan for ERCP Monday will coordinate with surgery in the event cholecystectomy is

## 2023-12-10 NOTE — H&P ADULT - HISTORY OF PRESENT ILLNESS
44 y/o male with h/o gastric ulcers, chronic Hep B, GERD, gallbladder polyp presented to ED c/o epigastric pain tonight. Pt states pain started at 6:30PM after dinner with associated chills. Pain worse with mylanta. Denies fevers, nausea, chest pain, vomiting, dysuria, melena, diarrhea, or pain radiating to back. Constipated today, but usually normal. Pt presented to ED for similar episodes on 12/8/23, 11/29/23, and  that resolved after taking maalox and pepcid.    In ED, afebrile, /84, HR 54, RR 18, spO2 99%. Labs remarkable for WBC 14.83, Hg 17.3, K+ 3.4, AST/ALT 27/56. CT A/P reveal Choledocholithiasis with 3 mm calculus seen within the distal common bile duct, likely associated biliary obstruction as there is associated common bile duct and gallbladder dilatation, Question mild wall thickening of the gastric antrum which may be related to underdistention versus gastritis. Probable constipation.    Of note:  CT A/P 5/2023: Cholelithiasis with small calcified gallstone in the neck of the gallbladder.  MRCP:7/21/23: No evidence of intra or extrahepatic biliary dilatation. Gallbladder polyp.               42 y/o male with h/o gastric ulcers, chronic Hep B, GERD, gallbladder polyp presented to ED c/o epigastric pain tonight. Pt states pain started at 6:30PM after dinner with associated chills. Pain worse with mylanta. Denies fevers, nausea, chest pain, vomiting, dysuria, melena, diarrhea, or pain radiating to back. Constipated today, but usually normal. Pt presented to ED for similar episodes on 12/8/23, 11/29/23, and  that resolved after taking maalox and pepcid.    In ED, afebrile, /84, HR 54, RR 18, spO2 99%. Labs remarkable for WBC 14.83, Hg 17.3, K+ 3.4, AST/ALT 27/56. CT A/P reveal Choledocholithiasis with 3 mm calculus seen within the distal common bile duct, likely associated biliary obstruction as there is associated common bile duct and gallbladder dilatation, Question mild wall thickening of the gastric antrum which may be related to underdistention versus gastritis. Probable constipation.    Of note:  CT A/P 5/2023: Cholelithiasis with small calcified gallstone in the neck of the gallbladder.  MRCP:7/21/23: No evidence of intra or extrahepatic biliary dilatation. Gallbladder polyp.               44 y/o male with h/o gastric ulcers, chronic Hep B, GERD, gallbladder polyp presented to ED c/o epigastric pain tonight. Pt states pain started at 6:30PM after dinner with associated chills. Pain worse with mylanta. Denies fevers, nausea, chest pain, vomiting, dysuria, melena, diarrhea, or pain radiating to back. Constipated today, but usually normal. Pt presented to ED for similar episodes on 12/8/23, 11/29/23, and  that resolved after taking maalox and pepcid.    In ED, afebrile, /84, HR 54, RR 18, spO2 99%. Labs remarkable for WBC 14.83, Hg 17.3, K+ 3.4, AST/ALT 27/56. CT A/P reveal Choledocholithiasis with 3 mm calculus seen within the distal common bile duct, likely associated biliary obstruction as there is associated common bile duct and gallbladder dilatation, Question mild wall thickening of the gastric antrum which may be related to underdistention versus gastritis. Probable constipation.    Of note:  CT A/P 3/2023: Cholelithiasis with small calcified gallstone in the neck of the gallbladder.  US RUQ 4/2023: Unchanged subcentimeter-sized gallbladder mural polyp.   MRCP:7/21/23: No evidence of intra or extrahepatic biliary dilatation. Gallbladder polyp.               42 y/o male with h/o gastric ulcers, chronic Hep B, GERD, gallbladder polyp presented to ED c/o epigastric pain tonight. Pt states pain started at 6:30PM after dinner with associated chills. Pain worse with mylanta. Denies fevers, nausea, chest pain, vomiting, dysuria, melena, diarrhea, or pain radiating to back. Constipated today, but usually normal. Pt presented to ED for similar episodes on 12/8/23, 11/29/23, and  that resolved after taking maalox and pepcid.    In ED, afebrile, /84, HR 54, RR 18, spO2 99%. Labs remarkable for WBC 14.83, Hg 17.3, K+ 3.4, AST/ALT 27/56. CT A/P reveal Choledocholithiasis with 3 mm calculus seen within the distal common bile duct, likely associated biliary obstruction as there is associated common bile duct and gallbladder dilatation, Question mild wall thickening of the gastric antrum which may be related to underdistention versus gastritis. Probable constipation. Given 1L bolus NS, pepcid, morphine.    Of note:  CT A/P 3/2023: Cholelithiasis with small calcified gallstone in the neck of the gallbladder.  US RUQ 4/2023: Unchanged subcentimeter-sized gallbladder mural polyp.   MRCP:7/21/23: No evidence of intra or extrahepatic biliary dilatation. Gallbladder polyp.               42 y/o male with h/o gastric ulcers, chronic Hep B, GERD, gallbladder polyp presented to ED c/o epigastric pain tonight. Pt states pain started at 6:30PM after dinner with associated chills. Pain worse with mylanta. Denies fevers, nausea, chest pain, vomiting, dysuria, melena, diarrhea, or pain radiating to back. Constipated today, but usually normal. Pt presented to ED for similar episodes on 12/8/23, 11/29/23, and  that resolved after taking maalox and pepcid.    In ED, afebrile, /84, HR 54, RR 18, spO2 99%. Labs remarkable for WBC 14.83, Hg 17.3, K+ 3.4, AST/ALT 27/56. CT A/P reveal Choledocholithiasis with 3 mm calculus seen within the distal common bile duct, likely associated biliary obstruction as there is associated common bile duct and gallbladder dilatation, Question mild wall thickening of the gastric antrum which may be related to underdistention versus gastritis. Probable constipation. Given 1L bolus NS, pepcid, morphine.    Currently, patient states pain and chills has resolved. However is worried that the pain will return.      Of note:  CT A/P 3/2023: Cholelithiasis with small calcified gallstone in the neck of the gallbladder.  US RUQ 4/2023: Unchanged subcentimeter-sized gallbladder mural polyp.   MRCP:7/21/23: No evidence of intra or extrahepatic biliary dilatation. Gallbladder polyp.

## 2023-12-10 NOTE — H&P ADULT - NSHPLABSRESULTS_GEN_ALL_CORE
Vital Signs Last 24 Hrs  T(C): 36.8 (09 Dec 2023 22:30), Max: 36.8 (09 Dec 2023 22:30)  T(F): 98.2 (09 Dec 2023 22:30), Max: 98.2 (09 Dec 2023 22:30)  HR: 54 (09 Dec 2023 22:30) (54 - 54)  BP: 123/84 (09 Dec 2023 22:30) (123/84 - 123/84)  BP(mean): --  RR: 18 (09 Dec 2023 22:30) (18 - 18)  SpO2: 99% (09 Dec 2023 22:30) (99% - 99%)    Parameters below as of 09 Dec 2023 22:30  Patient On (Oxygen Delivery Method): room air    < from: CT Abdomen and Pelvis w/ IV Cont (12.10.23 @ 02:21) >    Choledocholithiasis, with 3 mm calculus seen within the distal common   bile duct. There is likely associated biliary obstruction as there is   associated common bile duct and gallbladder dilatation.    Question mild wall thickening of the gastric antrum which may be related   to underdistention versus gastritis. Correlate clinically.    Probable constipation.

## 2023-12-11 ENCOUNTER — TRANSCRIPTION ENCOUNTER (OUTPATIENT)
Age: 43
End: 2023-12-11

## 2023-12-11 LAB
ALBUMIN SERPL ELPH-MCNC: 3.6 G/DL — SIGNIFICANT CHANGE UP (ref 3.3–5)
ALBUMIN SERPL ELPH-MCNC: 3.6 G/DL — SIGNIFICANT CHANGE UP (ref 3.3–5)
ALP SERPL-CCNC: 103 U/L — SIGNIFICANT CHANGE UP (ref 40–120)
ALP SERPL-CCNC: 103 U/L — SIGNIFICANT CHANGE UP (ref 40–120)
ALT FLD-CCNC: 56 U/L — HIGH (ref 10–45)
ALT FLD-CCNC: 56 U/L — HIGH (ref 10–45)
ANION GAP SERPL CALC-SCNC: 12 MMOL/L — SIGNIFICANT CHANGE UP (ref 5–17)
ANION GAP SERPL CALC-SCNC: 12 MMOL/L — SIGNIFICANT CHANGE UP (ref 5–17)
APTT BLD: 33.4 SEC — SIGNIFICANT CHANGE UP (ref 24.5–35.6)
APTT BLD: 33.4 SEC — SIGNIFICANT CHANGE UP (ref 24.5–35.6)
AST SERPL-CCNC: 28 U/L — SIGNIFICANT CHANGE UP (ref 10–40)
AST SERPL-CCNC: 28 U/L — SIGNIFICANT CHANGE UP (ref 10–40)
BASOPHILS # BLD AUTO: 0.05 K/UL — SIGNIFICANT CHANGE UP (ref 0–0.2)
BASOPHILS # BLD AUTO: 0.05 K/UL — SIGNIFICANT CHANGE UP (ref 0–0.2)
BASOPHILS NFR BLD AUTO: 0.5 % — SIGNIFICANT CHANGE UP (ref 0–2)
BASOPHILS NFR BLD AUTO: 0.5 % — SIGNIFICANT CHANGE UP (ref 0–2)
BILIRUB DIRECT SERPL-MCNC: 0.3 MG/DL — SIGNIFICANT CHANGE UP (ref 0–0.3)
BILIRUB DIRECT SERPL-MCNC: 0.3 MG/DL — SIGNIFICANT CHANGE UP (ref 0–0.3)
BILIRUB INDIRECT FLD-MCNC: 2 MG/DL — HIGH (ref 0.2–1)
BILIRUB INDIRECT FLD-MCNC: 2 MG/DL — HIGH (ref 0.2–1)
BILIRUB SERPL-MCNC: 2.3 MG/DL — HIGH (ref 0.2–1.2)
BILIRUB SERPL-MCNC: 2.3 MG/DL — HIGH (ref 0.2–1.2)
BLD GP AB SCN SERPL QL: SIGNIFICANT CHANGE UP
BLD GP AB SCN SERPL QL: SIGNIFICANT CHANGE UP
BUN SERPL-MCNC: 13 MG/DL — SIGNIFICANT CHANGE UP (ref 7–23)
BUN SERPL-MCNC: 13 MG/DL — SIGNIFICANT CHANGE UP (ref 7–23)
CALCIUM SERPL-MCNC: 8.8 MG/DL — SIGNIFICANT CHANGE UP (ref 8.4–10.5)
CALCIUM SERPL-MCNC: 8.8 MG/DL — SIGNIFICANT CHANGE UP (ref 8.4–10.5)
CHLORIDE SERPL-SCNC: 104 MMOL/L — SIGNIFICANT CHANGE UP (ref 96–108)
CHLORIDE SERPL-SCNC: 104 MMOL/L — SIGNIFICANT CHANGE UP (ref 96–108)
CO2 SERPL-SCNC: 23 MMOL/L — SIGNIFICANT CHANGE UP (ref 22–31)
CO2 SERPL-SCNC: 23 MMOL/L — SIGNIFICANT CHANGE UP (ref 22–31)
CREAT SERPL-MCNC: 0.82 MG/DL — SIGNIFICANT CHANGE UP (ref 0.5–1.3)
CREAT SERPL-MCNC: 0.82 MG/DL — SIGNIFICANT CHANGE UP (ref 0.5–1.3)
EGFR: 112 ML/MIN/1.73M2 — SIGNIFICANT CHANGE UP
EGFR: 112 ML/MIN/1.73M2 — SIGNIFICANT CHANGE UP
EOSINOPHIL # BLD AUTO: 0.13 K/UL — SIGNIFICANT CHANGE UP (ref 0–0.5)
EOSINOPHIL # BLD AUTO: 0.13 K/UL — SIGNIFICANT CHANGE UP (ref 0–0.5)
EOSINOPHIL NFR BLD AUTO: 1.3 % — SIGNIFICANT CHANGE UP (ref 0–6)
EOSINOPHIL NFR BLD AUTO: 1.3 % — SIGNIFICANT CHANGE UP (ref 0–6)
GLUCOSE SERPL-MCNC: 79 MG/DL — SIGNIFICANT CHANGE UP (ref 70–99)
GLUCOSE SERPL-MCNC: 79 MG/DL — SIGNIFICANT CHANGE UP (ref 70–99)
HCT VFR BLD CALC: 46.9 % — SIGNIFICANT CHANGE UP (ref 39–50)
HCT VFR BLD CALC: 46.9 % — SIGNIFICANT CHANGE UP (ref 39–50)
HGB BLD-MCNC: 16.1 G/DL — SIGNIFICANT CHANGE UP (ref 13–17)
HGB BLD-MCNC: 16.1 G/DL — SIGNIFICANT CHANGE UP (ref 13–17)
IMM GRANULOCYTES NFR BLD AUTO: 0.4 % — SIGNIFICANT CHANGE UP (ref 0–0.9)
IMM GRANULOCYTES NFR BLD AUTO: 0.4 % — SIGNIFICANT CHANGE UP (ref 0–0.9)
INR BLD: 1.08 RATIO — SIGNIFICANT CHANGE UP (ref 0.85–1.18)
INR BLD: 1.08 RATIO — SIGNIFICANT CHANGE UP (ref 0.85–1.18)
LYMPHOCYTES # BLD AUTO: 1 K/UL — SIGNIFICANT CHANGE UP (ref 1–3.3)
LYMPHOCYTES # BLD AUTO: 1 K/UL — SIGNIFICANT CHANGE UP (ref 1–3.3)
LYMPHOCYTES # BLD AUTO: 9.8 % — LOW (ref 13–44)
LYMPHOCYTES # BLD AUTO: 9.8 % — LOW (ref 13–44)
MCHC RBC-ENTMCNC: 30.2 PG — SIGNIFICANT CHANGE UP (ref 27–34)
MCHC RBC-ENTMCNC: 30.2 PG — SIGNIFICANT CHANGE UP (ref 27–34)
MCHC RBC-ENTMCNC: 34.3 GM/DL — SIGNIFICANT CHANGE UP (ref 32–36)
MCHC RBC-ENTMCNC: 34.3 GM/DL — SIGNIFICANT CHANGE UP (ref 32–36)
MCV RBC AUTO: 88 FL — SIGNIFICANT CHANGE UP (ref 80–100)
MCV RBC AUTO: 88 FL — SIGNIFICANT CHANGE UP (ref 80–100)
MONOCYTES # BLD AUTO: 0.88 K/UL — SIGNIFICANT CHANGE UP (ref 0–0.9)
MONOCYTES # BLD AUTO: 0.88 K/UL — SIGNIFICANT CHANGE UP (ref 0–0.9)
MONOCYTES NFR BLD AUTO: 8.6 % — SIGNIFICANT CHANGE UP (ref 2–14)
MONOCYTES NFR BLD AUTO: 8.6 % — SIGNIFICANT CHANGE UP (ref 2–14)
NEUTROPHILS # BLD AUTO: 8.11 K/UL — HIGH (ref 1.8–7.4)
NEUTROPHILS # BLD AUTO: 8.11 K/UL — HIGH (ref 1.8–7.4)
NEUTROPHILS NFR BLD AUTO: 79.4 % — HIGH (ref 43–77)
NEUTROPHILS NFR BLD AUTO: 79.4 % — HIGH (ref 43–77)
NRBC # BLD: 0 /100 WBCS — SIGNIFICANT CHANGE UP (ref 0–0)
NRBC # BLD: 0 /100 WBCS — SIGNIFICANT CHANGE UP (ref 0–0)
PLATELET # BLD AUTO: 192 K/UL — SIGNIFICANT CHANGE UP (ref 150–400)
PLATELET # BLD AUTO: 192 K/UL — SIGNIFICANT CHANGE UP (ref 150–400)
POTASSIUM SERPL-MCNC: 3.8 MMOL/L — SIGNIFICANT CHANGE UP (ref 3.5–5.3)
POTASSIUM SERPL-MCNC: 3.8 MMOL/L — SIGNIFICANT CHANGE UP (ref 3.5–5.3)
POTASSIUM SERPL-SCNC: 3.8 MMOL/L — SIGNIFICANT CHANGE UP (ref 3.5–5.3)
POTASSIUM SERPL-SCNC: 3.8 MMOL/L — SIGNIFICANT CHANGE UP (ref 3.5–5.3)
PROT SERPL-MCNC: 7 G/DL — SIGNIFICANT CHANGE UP (ref 6–8.3)
PROT SERPL-MCNC: 7 G/DL — SIGNIFICANT CHANGE UP (ref 6–8.3)
PROTHROM AB SERPL-ACNC: 12.6 SEC — SIGNIFICANT CHANGE UP (ref 9.5–13)
PROTHROM AB SERPL-ACNC: 12.6 SEC — SIGNIFICANT CHANGE UP (ref 9.5–13)
RBC # BLD: 5.33 M/UL — SIGNIFICANT CHANGE UP (ref 4.2–5.8)
RBC # BLD: 5.33 M/UL — SIGNIFICANT CHANGE UP (ref 4.2–5.8)
RBC # FLD: 12.4 % — SIGNIFICANT CHANGE UP (ref 10.3–14.5)
RBC # FLD: 12.4 % — SIGNIFICANT CHANGE UP (ref 10.3–14.5)
SODIUM SERPL-SCNC: 139 MMOL/L — SIGNIFICANT CHANGE UP (ref 135–145)
SODIUM SERPL-SCNC: 139 MMOL/L — SIGNIFICANT CHANGE UP (ref 135–145)
T4 FREE SERPL-MCNC: 1.2 NG/DL — SIGNIFICANT CHANGE UP (ref 0.9–1.8)
T4 FREE SERPL-MCNC: 1.2 NG/DL — SIGNIFICANT CHANGE UP (ref 0.9–1.8)
WBC # BLD: 10.21 K/UL — SIGNIFICANT CHANGE UP (ref 3.8–10.5)
WBC # BLD: 10.21 K/UL — SIGNIFICANT CHANGE UP (ref 3.8–10.5)
WBC # FLD AUTO: 10.21 K/UL — SIGNIFICANT CHANGE UP (ref 3.8–10.5)
WBC # FLD AUTO: 10.21 K/UL — SIGNIFICANT CHANGE UP (ref 3.8–10.5)

## 2023-12-11 PROCEDURE — 99233 SBSQ HOSP IP/OBS HIGH 50: CPT

## 2023-12-11 PROCEDURE — 43264 ERCP REMOVE DUCT CALCULI: CPT | Mod: 59

## 2023-12-11 PROCEDURE — 99232 SBSQ HOSP IP/OBS MODERATE 35: CPT

## 2023-12-11 PROCEDURE — 43262 ENDO CHOLANGIOPANCREATOGRAPH: CPT | Mod: 59

## 2023-12-11 PROCEDURE — 99233 SBSQ HOSP IP/OBS HIGH 50: CPT | Mod: 25

## 2023-12-11 DEVICE — CATH BLLN BIL RX 9-12CM
Type: IMPLANTABLE DEVICE | Status: NON-FUNCTIONAL
Removed: 2023-12-11

## 2023-12-11 DEVICE — HYDRATOME 44
Type: IMPLANTABLE DEVICE | Status: NON-FUNCTIONAL
Removed: 2023-12-11

## 2023-12-11 RX ORDER — SODIUM CHLORIDE 9 MG/ML
1000 INJECTION, SOLUTION INTRAVENOUS
Refills: 0 | Status: DISCONTINUED | OUTPATIENT
Start: 2023-12-11 | End: 2023-12-12

## 2023-12-11 RX ADMIN — Medication 650 MILLIGRAM(S): at 12:23

## 2023-12-11 RX ADMIN — Medication 650 MILLIGRAM(S): at 11:23

## 2023-12-11 RX ADMIN — TENOFOVIR DISOPROXIL FUMARATE 300 MILLIGRAM(S): 300 TABLET, FILM COATED ORAL at 11:22

## 2023-12-11 RX ADMIN — SODIUM CHLORIDE 100 MILLILITER(S): 9 INJECTION, SOLUTION INTRAVENOUS at 13:07

## 2023-12-11 RX ADMIN — Medication 100 MILLIGRAM(S): at 09:05

## 2023-12-11 RX ADMIN — PIPERACILLIN AND TAZOBACTAM 25 GRAM(S): 4; .5 INJECTION, POWDER, LYOPHILIZED, FOR SOLUTION INTRAVENOUS at 14:02

## 2023-12-11 RX ADMIN — PIPERACILLIN AND TAZOBACTAM 25 GRAM(S): 4; .5 INJECTION, POWDER, LYOPHILIZED, FOR SOLUTION INTRAVENOUS at 05:39

## 2023-12-11 RX ADMIN — PANTOPRAZOLE SODIUM 40 MILLIGRAM(S): 20 TABLET, DELAYED RELEASE ORAL at 11:22

## 2023-12-11 RX ADMIN — PIPERACILLIN AND TAZOBACTAM 25 GRAM(S): 4; .5 INJECTION, POWDER, LYOPHILIZED, FOR SOLUTION INTRAVENOUS at 21:38

## 2023-12-11 NOTE — PROGRESS NOTE ADULT - ASSESSMENT
42 y/o M with hx of chronic Hep B, hx of GB polyp c/ lower sternal epigastric pain admitted for choledocholithiasis.   Related he saw cardiologist last week for workup of increased frequency of palps. had Holter and results pending.     #Choledocholithiasis   #Hx gastritis  -CTAP with choledocholithiasis with 3mm calculus within the distal common bild duct  -GI consulted - recc NPO, IVF, anticipate ERCP 12/11  -Surgery consulted - risks vs benefits of surgery reviewed - patient declines surgery at this time  -Cardio consulted - for pre op cardiac risk stratification, if patient proceeds to surgery - patient is at low cardiac risk prior to ERCP and cholecystectomy  -Afebrile, leukocytosis downtrending  -Pain management  -IV PPI while NPO  -Continue zosyn (12/10- )    #Palpitations  -Related he saw cardiologist last week for workup of increased frequency of palps. had Holter and results pending  -Continue tele monitoring  -EKG reviewed. NSR, q waves in inferior leads  -Echo reviewed  -TSH wnl  -Cardio appreciated     #Chronic Hep B  -Cont Viread    DVT ppx: lovenox 44 y/o M with hx of chronic Hep B, hx of GB polyp c/ lower sternal epigastric pain admitted for choledocholithiasis.   Related he saw cardiologist last week for workup of increased frequency of palps. had Holter and results pending.     #Choledocholithiasis   #Hx gastritis  -CTAP with choledocholithiasis with 3mm calculus within the distal common bild duct  -GI consulted - recc NPO, IVF, anticipate ERCP 12/11  -Surgery consulted - risks vs benefits of surgery reviewed - patient declines surgery at this time  -Cardio consulted - for pre op cardiac risk stratification, if patient proceeds to surgery - patient is at low cardiac risk prior to ERCP and cholecystectomy  -Afebrile, leukocytosis downtrending  -Pain management  -IV PPI while NPO  -Continue zosyn (12/10- )    #Palpitations  -Related he saw cardiologist last week for workup of increased frequency of palps. had Holter and results pending  -Continue tele monitoring  -EKG reviewed. NSR, q waves in inferior leads  -Echo reviewed  -TSH wnl  -Cardio appreciated     #Chronic Hep B  -Cont Viread    DVT ppx: lovenox

## 2023-12-11 NOTE — PROGRESS NOTE ADULT - ASSESSMENT
Assessment:  Terry Ivy is a 43 year old man with past medical history of Chronic Hepatitis B, GERD, Gastric ulcers presented with epigastric pain, found to have choledocholithiasis and calculus within the distal common bile duct with possible biliary obstruction.    Cardiology consulted for pre-operative cardiac risk stratification prior to ERCP and cholecystectomy. ECG consistent with normal sinus rhythm, inferior Q waves/borderline nondiagnostic and left axis deviation, similar to prior ECG, no acute ischemic ST abnormalities. Troponins negative x 3, patient denies angina or dyspnea, there are no signs of an acute coronary syndrome. In addition, CT heart from 10/2022 with zero calcium score and no CAD.     Echo consistent with normal LVEF 60-65%, normal RV size and function.    Recommendations:  [] The patient is now s/p ERCP and tolerated the procedure well. He denies chest pain, shortness of breath or palpitations. No events on telemetry.     We will sign off, please re-consult if needed.     Shahzad Burrell MD  Cardiology

## 2023-12-11 NOTE — PROGRESS NOTE ADULT - SUBJECTIVE AND OBJECTIVE BOX
Patient is a 43y old  Male who presents with a chief complaint of choledocholithiasis (11 Dec 2023 07:24)      Patient seen and examined at bedside. pain controlled. denies headache, fever, chills, cp, sob, n/v.      ALLERGIES:  No Known Allergies    MEDICATIONS  (STANDING):  pantoprazole  Injectable 40 milliGRAM(s) IV Push daily  piperacillin/tazobactam IVPB.. 3.375 Gram(s) IV Intermittent every 8 hours  senna 2 Tablet(s) Oral at bedtime  sodium chloride 0.9%. 1000 milliLiter(s) (80 mL/Hr) IV Continuous <Continuous>  tenofovir disoproxil fumarate (VIREAD) 300 milliGRAM(s) Oral daily    MEDICATIONS  (PRN):  acetaminophen     Tablet .. 650 milliGRAM(s) Oral every 6 hours PRN Temp greater or equal to 38C (100.4F), Mild Pain (1 - 3)  aluminum hydroxide/magnesium hydroxide/simethicone Suspension 30 milliLiter(s) Oral every 4 hours PRN Dyspepsia  melatonin 3 milliGRAM(s) Oral at bedtime PRN Insomnia  ondansetron Injectable 4 milliGRAM(s) IV Push every 8 hours PRN Nausea and/or Vomiting  polyethylene glycol 3350 17 Gram(s) Oral daily PRN Constipation    Vital Signs Last 24 Hrs  T(F): 98.5 (11 Dec 2023 08:12), Max: 98.6 (11 Dec 2023 06:08)  HR: 81 (11 Dec 2023 08:12) (75 - 81)  BP: 111/62 (11 Dec 2023 08:12) (106/62 - 111/62)  RR: 18 (11 Dec 2023 08:12) (13 - 18)  SpO2: 98% (11 Dec 2023 08:12) (98% - 99%)  I&O's Summary    BMI (kg/m2): 29 (12-11-23 @ 08:12), 29.2 (12-08-23 @ 03:55), 29 (12-08-23 @ 03:02)  PHYSICAL EXAM:  General: NAD, A/O x 3  ENT: MMM, no scleral icterus  Neck: Supple, No JVD  Lungs: Clear to auscultation bilaterally, no wheezes, rales, rhonchi  Cardio: RRR, S1/S2  Abdomen: Soft, Nontender, Nondistended; Bowel sounds present  Extremities: No calf tenderness, No pitting edema    LABS:                        16.1   10.21 )-----------( 192      ( 11 Dec 2023 05:57 )             46.9       12-11    139  |  104  |  13  ----------------------------<  79  3.8   |  23  |  0.82    Ca    8.8      11 Dec 2023 05:57    TPro  7.0  /  Alb  3.6  /  TBili  2.3  /  DBili  0.3  /  AST  28  /  ALT  56  /  AlkPhos  103  12-11       PT/INR - ( 11 Dec 2023 05:57 )   PT: 12.6 sec;   INR: 1.08 ratio         PTT - ( 11 Dec 2023 05:57 )  PTT:33.4 sec     CARDIAC MARKERS ( 10 Dec 2023 10:15 )  x     / 4.3 ng/L / x     / x     / x      CARDIAC MARKERS ( 10 Dec 2023 04:54 )  x     / 5.0 ng/L / x     / x     / x      CARDIAC MARKERS ( 10 Dec 2023 00:40 )  x     / <4.0 ng/L / x     / x     / x            TSH 0.665   TSH with FT4 reflex --  Total T3 --                  Urinalysis Basic - ( 11 Dec 2023 05:57 )    Color: x / Appearance: x / SG: x / pH: x  Gluc: 79 mg/dL / Ketone: x  / Bili: x / Urobili: x   Blood: x / Protein: x / Nitrite: x   Leuk Esterase: x / RBC: x / WBC x   Sq Epi: x / Non Sq Epi: x / Bacteria: x            RADIOLOGY & ADDITIONAL TESTS:    Care Discussed with Consultants/Other Providers:

## 2023-12-11 NOTE — PROGRESS NOTE ADULT - ASSESSMENT
GI consult requested for 44 y/o male with Cholelithiasis, Patient has a PMH of gastric ulcers, chronic Hep B, GERD, gallbladder polyp presented to ED c/o epigastric pain.     Currently patient is clinically stable.  Abdominal CT impression:  Choledocholithiasis, with 3 mm calculus seen within the distal common   bile duct. There is likely associated biliary obstruction as there is   associated common bile duct and gallbladder dilatation.      Of note:  CT A/P 3/2023: Cholelithiasis with small calcified gallstone in the neck of the gallbladder.  US RUQ 4/2023: Unchanged subcentimeter-sized gallbladder mural polyp 7mm and 5mm.   MRCP:7/21/23: No evidence of intra or extrahepatic biliary dilatation. Gallbladder polyp.

## 2023-12-11 NOTE — PROGRESS NOTE ADULT - SUBJECTIVE AND OBJECTIVE BOX
INTERVAL HPI/ OVERNIGHT EVENTS:    Patient seen and examined at bedside.   Denies abdominal pain, denies N/V/D, no hematemesis and hematochezia   Last bm: 12/10            MEDICATIONS  (STANDING):  lactated ringers. 1000 milliLiter(s) (100 mL/Hr) IV Continuous <Continuous>  pantoprazole  Injectable 40 milliGRAM(s) IV Push daily  piperacillin/tazobactam IVPB.. 3.375 Gram(s) IV Intermittent every 8 hours  senna 2 Tablet(s) Oral at bedtime  tenofovir disoproxil fumarate (VIREAD) 300 milliGRAM(s) Oral daily    MEDICATIONS  (PRN):  acetaminophen     Tablet .. 650 milliGRAM(s) Oral every 6 hours PRN Temp greater or equal to 38C (100.4F), Mild Pain (1 - 3)  aluminum hydroxide/magnesium hydroxide/simethicone Suspension 30 milliLiter(s) Oral every 4 hours PRN Dyspepsia  melatonin 3 milliGRAM(s) Oral at bedtime PRN Insomnia  ondansetron Injectable 4 milliGRAM(s) IV Push every 8 hours PRN Nausea and/or Vomiting  polyethylene glycol 3350 17 Gram(s) Oral daily PRN Constipation      Allergies    No Known Allergies    Intolerances          Vital Signs Last 24 Hrs  T(C): 36.8 (11 Dec 2023 13:41), Max: 37 (11 Dec 2023 06:08)  T(F): 98.2 (11 Dec 2023 13:41), Max: 98.6 (11 Dec 2023 06:08)  HR: 69 (11 Dec 2023 13:41) (69 - 81)  BP: 110/66 (11 Dec 2023 13:41) (106/62 - 111/62)  BP(mean): --  RR: 18 (11 Dec 2023 13:41) (13 - 18)  SpO2: 96% (11 Dec 2023 13:41) (96% - 99%)    Parameters below as of 11 Dec 2023 13:41  Patient On (Oxygen Delivery Method): room air        PHYSICAL EXAM:    Constitutional: Well-developed  ENTT: clear conjunctivae and sclera  Respiratory: clear to auscultation  Cardiovascular: S1 and S2  Gastrointestinal: +Bowel Sounds all quadrants, soft, Non tender, non distended  Extremities: No peripheral edema, neg clubbing, cyanosis  Neurological: A/O x 3  Skin: warm and dry      LABS:                        16.1   10.21 )-----------( 192      ( 11 Dec 2023 05:57 )             46.9     12-11    139  |  104  |  13  ----------------------------<  79  3.8   |  23  |  0.82    Ca    8.8      11 Dec 2023 05:57    TPro  7.0  /  Alb  3.6  /  TBili  2.3<H>  /  DBili  0.3  /  AST  28  /  ALT  56<H>  /  AlkPhos  103  12-11    PT/INR - ( 11 Dec 2023 05:57 )   PT: 12.6 sec;   INR: 1.08 ratio         PTT - ( 11 Dec 2023 05:57 )  PTT:33.4 sec  Urinalysis Basic - ( 11 Dec 2023 05:57 )    Color: x / Appearance: x / SG: x / pH: x  Gluc: 79 mg/dL / Ketone: x  / Bili: x / Urobili: x   Blood: x / Protein: x / Nitrite: x   Leuk Esterase: x / RBC: x / WBC x   Sq Epi: x / Non Sq Epi: x / Bacteria: x      LIVER FUNCTIONS - ( 11 Dec 2023 05:57 )  Alb: 3.6 g/dL / Pro: 7.0 g/dL / ALK PHOS: 103 U/L / ALT: 56 U/L / AST: 28 U/L / GGT: x             RADIOLOGY & ADDITIONAL TESTS:

## 2023-12-11 NOTE — PROGRESS NOTE ADULT - SUBJECTIVE AND OBJECTIVE BOX
MIREILLE VALDEZ  [id number]      Chief Complaint: Choledocholithiasis     Interval History: The patient reports feeling well after ERCP. He denies palpitations, chest pain or shortness of breath.     Tele: sinus rhythm 60s BPM      Current meds:   acetaminophen     Tablet .. 650 milliGRAM(s) Oral every 6 hours PRN  aluminum hydroxide/magnesium hydroxide/simethicone Suspension 30 milliLiter(s) Oral every 4 hours PRN  melatonin 3 milliGRAM(s) Oral at bedtime PRN  ondansetron Injectable 4 milliGRAM(s) IV Push every 8 hours PRN  pantoprazole  Injectable 40 milliGRAM(s) IV Push daily  piperacillin/tazobactam IVPB.. 3.375 Gram(s) IV Intermittent every 8 hours  polyethylene glycol 3350 17 Gram(s) Oral daily PRN  senna 2 Tablet(s) Oral at bedtime  sodium chloride 0.9%. 1000 milliLiter(s) IV Continuous <Continuous>  tenofovir disoproxil fumarate (VIREAD) 300 milliGRAM(s) Oral daily      Objective:     Vital Signs:   T(C): 36.9 (12-11-23 @ 08:12), Max: 37 (12-11-23 @ 06:08)  HR: 81 (12-11-23 @ 08:12) (75 - 81)  BP: 111/62 (12-11-23 @ 08:12) (106/62 - 111/62)  RR: 18 (12-11-23 @ 08:12) (13 - 18)  SpO2: 98% (12-11-23 @ 08:12) (98% - 99%)  Wt(kg): --      Physical Exam:   General: well appearing, no distress  HEENT: sclera anicteric  Neck: supple  CVS: JVP ~ 7 cm H20, RRR, s1, s2, no murmurs/rubs/gallops  Chest: unlabored respirations, clear to auscultation b/l  Abdomen: non-distended  Extremities: no lower extremity edema b/l  Neuro: awake, alert & oriented   Psych: normal affect      Labs:   11 Dec 2023 05:57    139    |  104    |  13     ----------------------------<  79     3.8     |  23     |  0.82     Ca    8.8        11 Dec 2023 05:57    TPro  7.0    /  Alb  3.6    /  TBili  2.3    /  DBili  0.3    /  AST  28     /  ALT  56     /  AlkPhos  103    11 Dec 2023 05:57                          16.1   10.21 )-----------( 192      ( 11 Dec 2023 05:57 )             46.9     PT/INR - ( 11 Dec 2023 05:57 )   PT: 12.6 sec;   INR: 1.08 ratio         PTT - ( 11 Dec 2023 05:57 )  PTT:33.4 sec        Outpatient TTE (10/2022):  Normal LV and RV systolic function    CT Heart (10/2022):  The calculated coronary artery calcium Agatston score is 0.    TTE (12/10/23):   1. Normal global left ventricular systolic function.   2. Left ventricular ejection fraction, by visual estimation, is 60 to 65%.   3. Calculated LVEF by Simpsons Biplane Method 60%.   4. Normal right ventricular size and function.   5. The left atrium is normal in size.   6. The right atrium is normal in size.   7. Trace mitral valve regurgitation.   8. No aortic valve stenosis.   9. There is no evidence of pericardial effusion.      ECG (12/10/23): normal sinus rhythm, inferior Q waves, left axis deviation (similar to prior ECG)       MIREILLE VALDEZ  [id number]      Chief Complaint: Choledocholithiasis     Interval History: The patient reports feeling well after ERCP. He denies palpitations, chest pain or shortness of breath.     Tele: sinus rhythm 60s BPM      Current meds:   acetaminophen     Tablet .. 650 milliGRAM(s) Oral every 6 hours PRN  aluminum hydroxide/magnesium hydroxide/simethicone Suspension 30 milliLiter(s) Oral every 4 hours PRN  melatonin 3 milliGRAM(s) Oral at bedtime PRN  ondansetron Injectable 4 milliGRAM(s) IV Push every 8 hours PRN  pantoprazole  Injectable 40 milliGRAM(s) IV Push daily  piperacillin/tazobactam IVPB.. 3.375 Gram(s) IV Intermittent every 8 hours  polyethylene glycol 3350 17 Gram(s) Oral daily PRN  senna 2 Tablet(s) Oral at bedtime  sodium chloride 0.9%. 1000 milliLiter(s) IV Continuous <Continuous>  tenofovir disoproxil fumarate (VIREAD) 300 milliGRAM(s) Oral daily      Objective:     Vital Signs:   T(C): 36.9 (12-11-23 @ 08:12), Max: 37 (12-11-23 @ 06:08)  HR: 81 (12-11-23 @ 08:12) (75 - 81)  BP: 111/62 (12-11-23 @ 08:12) (106/62 - 111/62)  RR: 18 (12-11-23 @ 08:12) (13 - 18)  SpO2: 98% (12-11-23 @ 08:12) (98% - 99%)  Wt(kg): --      Physical Exam:   General: well appearing, no distress  HEENT: sclera anicteric  Neck: supple  CVS: JVP ~ 7 cm H20, RRR, s1, s2, no murmurs/rubs/gallops  Chest: unlabored respirations, clear to auscultation b/l  Abdomen: non-distended  Extremities: no lower extremity edema b/l  Neuro: awake, alert & oriented   Psych: normal affect      Labs:   11 Dec 2023 05:57    139    |  104    |  13     ----------------------------<  79     3.8     |  23     |  0.82     Ca    8.8        11 Dec 2023 05:57    TPro  7.0    /  Alb  3.6    /  TBili  2.3    /  DBili  0.3    /  AST  28     /  ALT  56     /  AlkPhos  103    11 Dec 2023 05:57                          16.1   10.21 )-----------( 192      ( 11 Dec 2023 05:57 )             46.9     PT/INR - ( 11 Dec 2023 05:57 )   PT: 12.6 sec;   INR: 1.08 ratio         PTT - ( 11 Dec 2023 05:57 )  PTT:33.4 sec        Outpatient TTE (10/2022):  Normal LV and RV systolic function    CT Heart (10/2022):  The calculated coronary artery calcium Agatston score is 0.    TTE (12/10/23):   1. Normal global left ventricular systolic function.   2. Left ventricular ejection fraction, by visual estimation, is 60 to 65%.   3. Calculated LVEF by Simpsons Biplane Method 60%.   4. Normal right ventricular size and function.   5. The left atrium is normal in size.   6. The right atrium is normal in size.   7. Trace mitral valve regurgitation.   8. No aortic valve stenosis.   9. There is no evidence of pericardial effusion.      ECG (12/10/23): normal sinus rhythm, inferior Q waves, left axis deviation (similar to prior ECG)

## 2023-12-12 ENCOUNTER — TRANSCRIPTION ENCOUNTER (OUTPATIENT)
Age: 43
End: 2023-12-12

## 2023-12-12 VITALS
HEART RATE: 62 BPM | RESPIRATION RATE: 17 BRPM | SYSTOLIC BLOOD PRESSURE: 101 MMHG | TEMPERATURE: 99 F | OXYGEN SATURATION: 99 % | DIASTOLIC BLOOD PRESSURE: 70 MMHG

## 2023-12-12 LAB
ALBUMIN SERPL ELPH-MCNC: 3.4 G/DL — SIGNIFICANT CHANGE UP (ref 3.3–5)
ALBUMIN SERPL ELPH-MCNC: 3.4 G/DL — SIGNIFICANT CHANGE UP (ref 3.3–5)
ALP SERPL-CCNC: 119 U/L — SIGNIFICANT CHANGE UP (ref 40–120)
ALP SERPL-CCNC: 119 U/L — SIGNIFICANT CHANGE UP (ref 40–120)
ALT FLD-CCNC: 65 U/L — HIGH (ref 10–45)
ALT FLD-CCNC: 65 U/L — HIGH (ref 10–45)
ANION GAP SERPL CALC-SCNC: 12 MMOL/L — SIGNIFICANT CHANGE UP (ref 5–17)
ANION GAP SERPL CALC-SCNC: 12 MMOL/L — SIGNIFICANT CHANGE UP (ref 5–17)
AST SERPL-CCNC: 29 U/L — SIGNIFICANT CHANGE UP (ref 10–40)
AST SERPL-CCNC: 29 U/L — SIGNIFICANT CHANGE UP (ref 10–40)
BILIRUB SERPL-MCNC: 1.8 MG/DL — HIGH (ref 0.2–1.2)
BILIRUB SERPL-MCNC: 1.8 MG/DL — HIGH (ref 0.2–1.2)
BUN SERPL-MCNC: 11 MG/DL — SIGNIFICANT CHANGE UP (ref 7–23)
BUN SERPL-MCNC: 11 MG/DL — SIGNIFICANT CHANGE UP (ref 7–23)
CALCIUM SERPL-MCNC: 9.1 MG/DL — SIGNIFICANT CHANGE UP (ref 8.4–10.5)
CALCIUM SERPL-MCNC: 9.1 MG/DL — SIGNIFICANT CHANGE UP (ref 8.4–10.5)
CHLORIDE SERPL-SCNC: 105 MMOL/L — SIGNIFICANT CHANGE UP (ref 96–108)
CHLORIDE SERPL-SCNC: 105 MMOL/L — SIGNIFICANT CHANGE UP (ref 96–108)
CO2 SERPL-SCNC: 26 MMOL/L — SIGNIFICANT CHANGE UP (ref 22–31)
CO2 SERPL-SCNC: 26 MMOL/L — SIGNIFICANT CHANGE UP (ref 22–31)
CREAT SERPL-MCNC: 0.84 MG/DL — SIGNIFICANT CHANGE UP (ref 0.5–1.3)
CREAT SERPL-MCNC: 0.84 MG/DL — SIGNIFICANT CHANGE UP (ref 0.5–1.3)
EGFR: 112 ML/MIN/1.73M2 — SIGNIFICANT CHANGE UP
EGFR: 112 ML/MIN/1.73M2 — SIGNIFICANT CHANGE UP
GLUCOSE SERPL-MCNC: 90 MG/DL — SIGNIFICANT CHANGE UP (ref 70–99)
GLUCOSE SERPL-MCNC: 90 MG/DL — SIGNIFICANT CHANGE UP (ref 70–99)
HCT VFR BLD CALC: 43.4 % — SIGNIFICANT CHANGE UP (ref 39–50)
HCT VFR BLD CALC: 43.4 % — SIGNIFICANT CHANGE UP (ref 39–50)
HGB BLD-MCNC: 15.6 G/DL — SIGNIFICANT CHANGE UP (ref 13–17)
HGB BLD-MCNC: 15.6 G/DL — SIGNIFICANT CHANGE UP (ref 13–17)
MCHC RBC-ENTMCNC: 30.5 PG — SIGNIFICANT CHANGE UP (ref 27–34)
MCHC RBC-ENTMCNC: 30.5 PG — SIGNIFICANT CHANGE UP (ref 27–34)
MCHC RBC-ENTMCNC: 35.9 GM/DL — SIGNIFICANT CHANGE UP (ref 32–36)
MCHC RBC-ENTMCNC: 35.9 GM/DL — SIGNIFICANT CHANGE UP (ref 32–36)
MCV RBC AUTO: 84.8 FL — SIGNIFICANT CHANGE UP (ref 80–100)
MCV RBC AUTO: 84.8 FL — SIGNIFICANT CHANGE UP (ref 80–100)
NRBC # BLD: 0 /100 WBCS — SIGNIFICANT CHANGE UP (ref 0–0)
NRBC # BLD: 0 /100 WBCS — SIGNIFICANT CHANGE UP (ref 0–0)
PLATELET # BLD AUTO: 227 K/UL — SIGNIFICANT CHANGE UP (ref 150–400)
PLATELET # BLD AUTO: 227 K/UL — SIGNIFICANT CHANGE UP (ref 150–400)
POTASSIUM SERPL-MCNC: 4.2 MMOL/L — SIGNIFICANT CHANGE UP (ref 3.5–5.3)
POTASSIUM SERPL-MCNC: 4.2 MMOL/L — SIGNIFICANT CHANGE UP (ref 3.5–5.3)
POTASSIUM SERPL-SCNC: 4.2 MMOL/L — SIGNIFICANT CHANGE UP (ref 3.5–5.3)
POTASSIUM SERPL-SCNC: 4.2 MMOL/L — SIGNIFICANT CHANGE UP (ref 3.5–5.3)
PROT SERPL-MCNC: 7 G/DL — SIGNIFICANT CHANGE UP (ref 6–8.3)
PROT SERPL-MCNC: 7 G/DL — SIGNIFICANT CHANGE UP (ref 6–8.3)
RBC # BLD: 5.12 M/UL — SIGNIFICANT CHANGE UP (ref 4.2–5.8)
RBC # BLD: 5.12 M/UL — SIGNIFICANT CHANGE UP (ref 4.2–5.8)
RBC # FLD: 11.8 % — SIGNIFICANT CHANGE UP (ref 10.3–14.5)
RBC # FLD: 11.8 % — SIGNIFICANT CHANGE UP (ref 10.3–14.5)
SODIUM SERPL-SCNC: 143 MMOL/L — SIGNIFICANT CHANGE UP (ref 135–145)
SODIUM SERPL-SCNC: 143 MMOL/L — SIGNIFICANT CHANGE UP (ref 135–145)
WBC # BLD: 11.39 K/UL — HIGH (ref 3.8–10.5)
WBC # BLD: 11.39 K/UL — HIGH (ref 3.8–10.5)
WBC # FLD AUTO: 11.39 K/UL — HIGH (ref 3.8–10.5)
WBC # FLD AUTO: 11.39 K/UL — HIGH (ref 3.8–10.5)

## 2023-12-12 PROCEDURE — 84439 ASSAY OF FREE THYROXINE: CPT

## 2023-12-12 PROCEDURE — 96376 TX/PRO/DX INJ SAME DRUG ADON: CPT

## 2023-12-12 PROCEDURE — 93005 ELECTROCARDIOGRAM TRACING: CPT

## 2023-12-12 PROCEDURE — 86900 BLOOD TYPING SEROLOGIC ABO: CPT

## 2023-12-12 PROCEDURE — C1769: CPT

## 2023-12-12 PROCEDURE — 83690 ASSAY OF LIPASE: CPT

## 2023-12-12 PROCEDURE — 99285 EMERGENCY DEPT VISIT HI MDM: CPT

## 2023-12-12 PROCEDURE — 84481 FREE ASSAY (FT-3): CPT

## 2023-12-12 PROCEDURE — 93306 TTE W/DOPPLER COMPLETE: CPT

## 2023-12-12 PROCEDURE — 74177 CT ABD & PELVIS W/CONTRAST: CPT | Mod: MA

## 2023-12-12 PROCEDURE — 82248 BILIRUBIN DIRECT: CPT

## 2023-12-12 PROCEDURE — 76705 ECHO EXAM OF ABDOMEN: CPT

## 2023-12-12 PROCEDURE — 80048 BASIC METABOLIC PNL TOTAL CA: CPT

## 2023-12-12 PROCEDURE — 36415 COLL VENOUS BLD VENIPUNCTURE: CPT

## 2023-12-12 PROCEDURE — C1773: CPT

## 2023-12-12 PROCEDURE — 84484 ASSAY OF TROPONIN QUANT: CPT

## 2023-12-12 PROCEDURE — 86850 RBC ANTIBODY SCREEN: CPT

## 2023-12-12 PROCEDURE — 86901 BLOOD TYPING SEROLOGIC RH(D): CPT

## 2023-12-12 PROCEDURE — 85027 COMPLETE CBC AUTOMATED: CPT

## 2023-12-12 PROCEDURE — 71045 X-RAY EXAM CHEST 1 VIEW: CPT

## 2023-12-12 PROCEDURE — 96365 THER/PROPH/DIAG IV INF INIT: CPT

## 2023-12-12 PROCEDURE — 99239 HOSP IP/OBS DSCHRG MGMT >30: CPT

## 2023-12-12 PROCEDURE — C9399: CPT

## 2023-12-12 PROCEDURE — 85025 COMPLETE CBC W/AUTO DIFF WBC: CPT

## 2023-12-12 PROCEDURE — 85730 THROMBOPLASTIN TIME PARTIAL: CPT

## 2023-12-12 PROCEDURE — 76000 FLUOROSCOPY <1 HR PHYS/QHP: CPT

## 2023-12-12 PROCEDURE — 96375 TX/PRO/DX INJ NEW DRUG ADDON: CPT

## 2023-12-12 PROCEDURE — 80076 HEPATIC FUNCTION PANEL: CPT

## 2023-12-12 PROCEDURE — 85610 PROTHROMBIN TIME: CPT

## 2023-12-12 PROCEDURE — 84443 ASSAY THYROID STIM HORMONE: CPT

## 2023-12-12 PROCEDURE — 80053 COMPREHEN METABOLIC PANEL: CPT

## 2023-12-12 RX ORDER — PANTOPRAZOLE SODIUM 20 MG/1
40 TABLET, DELAYED RELEASE ORAL
Refills: 0 | Status: DISCONTINUED | OUTPATIENT
Start: 2023-12-12 | End: 2023-12-12

## 2023-12-12 RX ORDER — PANTOPRAZOLE SODIUM 20 MG/1
1 TABLET, DELAYED RELEASE ORAL
Qty: 30 | Refills: 0
Start: 2023-12-12 | End: 2024-01-10

## 2023-12-12 RX ADMIN — PIPERACILLIN AND TAZOBACTAM 25 GRAM(S): 4; .5 INJECTION, POWDER, LYOPHILIZED, FOR SOLUTION INTRAVENOUS at 05:11

## 2023-12-12 NOTE — DISCHARGE NOTE PROVIDER - NSDCQMSTAIRS_GEN_ALL_CORE
Occupational Therapy Plan of Care  Visit Count: 6/8  Plan of Care: Initial: 7/23/2018 Through: 10/1/2018  Insurance Information:  **OT THERAPY BENEFITS:**  PAYOR: ARNOLD CESPEDES  VISIT LIMIT: 20 VISITS PER CALENDAR YEAR FOR OT  AUTHORIZATION NEEDED: AFTER EVAL THROUGH [facility] [phone]  NOTES: 6 VISITS USED      DEDUCTIBLE: $48634.00        MET: $8140.86  OUT OF POCKET: $41559.00        MET: $19108.13  COINSURANCE: 0%  COPAY: $0  REF #: ONLINE  THIS QUOTE OF BENEFITS IS NOT A GUARANTEE OF PAYMENT    Referred by: Lionel Bah*; Next provider visit (if known/scheduled): not known  Medical Diagnosis (from order):    434.91 (ICD-9-CM) - I63.9 (ICD-10-CM) - Cerebrovascular accident (CVA), unspecified mechanism (CMS/HCC)   191.9 (ICD-9-CM) - C71.9 (ICD-10-CM) - Malignant neoplasm of brain, unspecified location (CMS/HCC)       Treatment Diagnosis: impaired strength, impaired range of motion, impaired scapulohumeral rhythm, impaired activity tolerance, impaired coordination, impaired sensory integration following Cerebrovascular Accident (CVA)    Onset information and chart reviewed.  Author contributed to and reviewed content in the neuro rehabilitation intake. Intake form content to be included and reviewed with this evaluation.   Precautions: seizure    SUBJECTIVE   I forgot my splints    No pain  OBJECTIVE   Blood Pressure: Not tested  Hand Dominance: right  Posture/Observation: flexed posture    Synergy patterns: Yes    [place]-[facility]a Stroke Assessment:  Impairment Inventory: Stage of Recovery of Arm and Hand  Starting position: sitting with forearm in lap in a neutral position, wrist at 0 degrees and fingers slightly flexed.  Score highest stage where patient achieves 2 of 3 tasks.  Stage Number Arm Score   1 Not yet stage 2    2 Resistance to passive shoulder abduction or elbow extension     Facilitated elbow extension X    Facilitated elbow flexion X   3 Touch opposite knee partial    Touch chin X     Shoulder shrugging > ½ range no   4 Extension synergy, then flexion synergy partial    Shoulder flexion to 90° X    Elbow at side, 90° flexion: supination then pronation no   5 Flexion synergy, then extension synergy     Shoulder abduction to 90° with pronation     Shoulder flexion to 90°: pronation then supination    6 Hand from knee to forehead 5X in 5 seconds     Shoulder flexion to 90° trace a figure 8     Arm resting at side of body: raise arm    7 Clap hands overhead, then behind back 3x in 5 sec     Shoulder flexion to 90°: scissor in front 3X in 5 sec     Elbow at side, 90° flexion: resisted shoulder external rotation     Stage of Arm 2     Stage Number Hand Score   1 Not yet Stage 2    2 Positive Kaufman yes    Resistance to passive wrist or finger extension yes    Facilitated finger flexion yes   3 Wrist extension > 1/2 range no    Finger/wrist flexion > 1/2 range partial    Supination, thumb in extension: thumb to index finger    4 Finger extension, then flexion     Thumb extension> 1/2 range, then lateral prehension     Finger flexion with lateral prehension    5 Finger flexion, then extension     Pronation: finger abduction     Hand unsupported: opposition of thumb to little finger    6 Pronation: tap index finger 10X in 5 sec     Pistol : pull trigger, then return     Pronation: wrist and finger extension with finger abduction    7 Thumb to finger tips, then reverse 3X in 12 sec     Bounce a ball 4 times in succession, then catch     Pour 250 ml. From 1 litre pitcher, then reverse     Stage of Hand 2     Tone:     Modified Tyler Scale    Involved Score   Elbow 1 (slight increase in tone)   Wrist 1 (slight increase in tone)   Fingers 1+ (slight increase in tone with resist through half of motion)   Thumb 1+ (slight increase in tone with resist through half of motion)     Range of Motion / Strength (out of 5):     Right   ROM Left   ROM  Right Strength Left Strength   Date Initial Initial Initial  Initial   Shoulder Flexion (170-180) Can touch top of head  4/5    Shoulder Abduction (170-180) Can move arm above head      Elbow Flexion (140-150)   3/5    Elbow Extension (0-10)   3/5    Forearm Supination (90)   2+/5    Forearm Pronation (90)   3/5    Wrist Flexion (80)   2+/5    Wrist Extension (70)   1/5    Gross finger flexion   3/5    Gross finger extension   1/5    Thumb opposition              Range of motion (ROM) reported in degrees, active range of motion recorded unless noted as AA=active assistive or P=passive; standard testing positions unless otherwise noted, norms included in ( ); *=pain   All motions within functional/normal limits except as noted.  Gross muscle strength within functional/normal limits except as noted.     /Pinch (pounds of force)- NT due to time constraints   Left Right   Date Initial Initial    NT NT   Lateral Pinch NT NT   3 Point Pinch NT NT   Tip Pinch NT NT   Norms:  : Age: 30-39: male: left 88.7-134.6, right 99.4-143.7; female: left 50.3-78.0, right 59.5-84.9  Key/lateral pinch: Age: 30-34: male: left 26.2+/-5.1, right 26.4+/-4.8; female: left 17.8+/-3.6, right 18.7+/-3.0  Palmar/3 point pinch: Age: 30-34: male: left 25.4+/-5.7, right 24.7+/-4.7; female: left 18.1+/-4.8, right 19.3+/-5.0  Tip pinch: Age: 30-34: male: left 17.6+/-4.8, right 17.6+/-6.7; female: left 11.7+/-2.8, right 12.6+/-3.0    Coordination: (seconds)   Left Right   Date Initial  Initial   9 Hole Peg   Unable to complete   Box and Block  0, with assistance to get blocks into fingertips, then able to get 3-4 in a minute   Functional Dexterity Test  NT   Norms:   9 hole: Age: 30-34: male: left 18.7 +/-2.2, right 17.7 +/-2.5;  female: left 17.8 +/-2.0, right 16.3 +/-1.9  Outcome Measures: (Outcome Scoring)  [facility] Participation Index (0 - 30): 26/30    [place] Arm and Hand Activity Inventory   Activity Scale     1. Total assist (weak U/L < 25%)  2. Maximal assist (weak U/L  25-49%)  3. Moderate assist (weak U/L 50-74%) 4. Minimal Assist (weak U/L > 75%)  5. Supervision  6. Modified Piute (device) 7. Complete Piute (timely, safely)     Affected Limb  Score   1. Open jar of coffee _3_ holds jar __ holds lid    2. Call 911 _4_ holds  __ dials phone    3. Draw line with ruler _4_ holds ruler __holds pen    4. Pour glass of water _3 holds glass __ holds pitcher    5. Wring out washcloth      6. Do up 5 buttons      7. Dry back with towel _3_ reaches for towel __ grasps towel end    8. Put paste on toothbrush __3 holds toothpaste __ holds brush    9. Cut medium resistance putty __ holds knife _3_ holds fork    Total Score 20 3 23 /63       Initial Treatment   Neuro-reeducation:  1. Readjusted wrist cock up splint molding c-splint onto side of splint to allow for both wrist support but also provide setup of thumb/index/middle for lateral and palmar pinch. Once re-fabricated, pt then able to pinch smaller items and transport/release to specific locations at shoulder height with occasional min A for Gross grasp and pinch for cylinder and small items using hand based C splint plus wrist cock up for support.  50 reps for grasp release of various objects in stance from tabletop to side by side crate or to lower level surface. Pt performed best with c-splint and wrist cock up splint.   2. Sustained focus/problem solving to sequence and run NMES P2 at 24 HZ to facilitate wrist and finger extension for pre-grasp of all objects, grasp object, transport to surface at shoulder height and drop items into container self initiating use of NMES unit. Completed over 20 reps with occasional min A for positioning or release of object.    HEP:   Use of RUE as a stabalizer such as holding onto cup or the toothpaste bottle    Plan for next session: continue with reps with both NMES large household items and pinch grasp with splint. NMES movements at 24 Hz, consider standing ue activities to  increase balance for adls   ASSESSMENT   Adhered c-splint and wrist cock up for wrist support and pinch positioning to improve functional use of hand to pinch and release small items continously with minimal assist from therapist. Pt also worked on sequencing and self initiating use of NMES to facilitate wrist/finger extension for grasp/release of larger items, with carryover by end of session. Educated pt's mom on HEP for carryover and how to don/doff splint.     Result of initial outlined education: Needs reinforcement, educated pt's mother as well    PLAN   Goals:  To be obtained by end of this plan of care:  1. Patient independent with modified and progressed home exercise program.  2. Pt will be able to sequence putting on his shirt orienting the shirt with 2 or less cues at a min A level  3. Pt will be able to toilet self at a distance supervision using RUE to assist with stabalization of clothing  4. Pt will be able to eat finger foods using RUE with splint as needed for wrist support at mod I level.  5. Pt will be able to lift a cup off the table and place to another location using RUE with min A level.     The following skilled interventions to be implemented to achieve above:  Neuromuscular Re-Education (60610)       Frequency/Duration: 2 times per week for 4 weeks with tapering as the patient progresses    patient involved in and agreed to plan of care and goals.  Attendance policy provided at time of evaluation.        THERAPY DAILY BILLING    1. ANTHEM/BCBS 2. MEDICAID [id number]    Evaluation Procedures:  No evaluation codes were used on this date of service    Timed Procedures:  Neuromuscular Re-Education, 60 minutes    Untimed Procedures:  No untimed codes were used on this date of service    Total Treatment Time: 60 minutes total     No

## 2023-12-12 NOTE — DISCHARGE NOTE PROVIDER - NSDCMRMEDTOKEN_GEN_ALL_CORE_FT
Protonix 40 mg oral delayed release tablet: 1 tab(s) orally once a day  tenofovir disoproxil fumarate 300 mg oral tablet: 1 tab(s) orally once a day

## 2023-12-12 NOTE — PROGRESS NOTE ADULT - ASSESSMENT
42 y/o M with hx of chronic Hep B, hx of GB polyp c/ lower sternal epigastric pain admitted for choledocholithiasis.   Related he saw cardiologist last week for workup of increased frequency of palps. had Holter and results pending.     #Choledocholithiasis  #Hx gastritis  -CTAP with choledocholithiasis with 3mm calculus within the distal common bild duct  -GI consulted - s/p ERCP 12/11, may discharge home  -Surgery consulted - risks vs benefits of surgery reviewed - patient declines surgery at this time  -Cardio consulted - for pre op cardiac risk stratification, if patient proceeds to surgery - patient is at low cardiac risk prior to ERCP and cholecystectomy  -Pain management  -Advance diet as tolerated  -Continue zosyn (12/10- )    #Palpitations - improved  -Related he saw cardiologist last week for workup of increased frequency of palps. Had Holter and results pending  -Continue tele monitoring  -EKG reviewed. NSR, q waves in inferior leads  -Echo reviewed  -TSH wnl  -Cardio appreciated     #Chronic Hep B  -Cont Viread    DVT ppx: lovenox    Anticipate discharge home 12/12 pending labs 42 y/o M with hx of chronic Hep B, hx of GB polyp c/ lower sternal epigastric pain admitted for choledocholithiasis.   Related he saw cardiologist last week for workup of increased frequency of palps. had Holter and results pending.     #Choledocholithiasis  #Hx gastritis  -CTAP with choledocholithiasis with 3mm calculus within the distal common bile duct  -GI consulted - s/p ERCP 12/11, may discharge home  -Surgery consulted - risks vs benefits of surgery reviewed - patient declines surgery at this time  -Cardio consulted - for pre op cardiac risk stratification, if patient proceeds to surgery - patient is at low cardiac risk prior to ERCP and cholecystectomy  -Pain management  -Advance diet as tolerated  -Continue zosyn (12/10- )    #Palpitations - improved  -Related he saw cardiologist last week for workup of increased frequency of palps. Had Holter and results pending  -Continue tele monitoring  -EKG reviewed. NSR, q waves in inferior leads  -Echo reviewed  -TSH wnl  -Cardio appreciated     #Chronic Hep B  -Cont Viread    DVT ppx: lovenox    Medically optimized for discharge home  44 y/o M with hx of chronic Hep B, hx of GB polyp c/ lower sternal epigastric pain admitted for choledocholithiasis.   Related he saw cardiologist last week for workup of increased frequency of palps. had Holter and results pending.     #Choledocholithiasis  #Hx gastritis  -CTAP with choledocholithiasis with 3mm calculus within the distal common bile duct  -GI consulted - s/p ERCP 12/11, may discharge home  -Surgery consulted - risks vs benefits of surgery reviewed - patient declines surgery at this time  -Cardio consulted - for pre op cardiac risk stratification, if patient proceeds to surgery - patient is at low cardiac risk prior to ERCP and cholecystectomy  -Pain management  -Advance diet as tolerated  -Continue zosyn (12/10- )    #Palpitations - improved  -Related he saw cardiologist last week for workup of increased frequency of palps. Had Holter and results pending  -Continue tele monitoring  -EKG reviewed. NSR, q waves in inferior leads  -Echo reviewed  -TSH wnl  -Cardio appreciated     #Chronic Hep B  -Cont Viread    DVT ppx: lovenox    Medically optimized for discharge home

## 2023-12-12 NOTE — DISCHARGE NOTE PROVIDER - NSDCCPCAREPLAN_GEN_ALL_CORE_FT
PRINCIPAL DISCHARGE DIAGNOSIS  Diagnosis: Choledocholithiasis  Assessment and Plan of Treatment: Follow up with your primary care provider within 1 week     PRINCIPAL DISCHARGE DIAGNOSIS  Diagnosis: Choledocholithiasis  Assessment and Plan of Treatment: Follow up with your primary care provider within 1 week. Continue antibiotics as prescribed - Augmentin 875mg 1 tab twice daily for 3 days, called into the pharmacy.

## 2023-12-12 NOTE — DISCHARGE NOTE PROVIDER - HOSPITAL COURSE
Hospital Course  HPI:  44 y/o male with h/o gastric ulcers, chronic Hep B, GERD, gallbladder polyp presented to ED c/o epigastric pain tonight. Pt states pain started at 6:30PM after dinner with associated chills. Pain worse with mylanta. Denies fevers, nausea, chest pain, vomiting, dysuria, melena, diarrhea, or pain radiating to back. Constipated today, but usually normal. Pt presented to ED for similar episodes on 12/8/23, 11/29/23, and  that resolved after taking maalox and pepcid. In ED, afebrile, /84, HR 54, RR 18, spO2 99%. Labs remarkable for WBC 14.83, Hg 17.3, K+ 3.4, AST/ALT 27/56. CT A/P reveal Choledocholithiasis with 3 mm calculus seen within the distal common bile duct, likely associated biliary obstruction as there is associated common bile duct and gallbladder dilatation, Question mild wall thickening of the gastric antrum which may be related to underdistention versus gastritis. Probable constipation. Given 1L bolus NS, pepcid, morphine. Currently, patient states pain and chills has resolved. However is worried that the pain will return.  Of note:  CT A/P 3/2023: Cholelithiasis with small calcified gallstone in the neck of the gallbladder.  US RUQ 4/2023: Unchanged subcentimeter-sized gallbladder mural polyp.   MRCP:7/21/23: No evidence of intra or extrahepatic biliary dilatation. Gallbladder polyp.  (10 Dec 2023 03:56)    Admitted in stable condition. GI consulted, ERCP resulted as below. Patient did not want surgery at this time. Diet advanced as tolerated. Zosyn transition to augmentin to complete 5 days.     Source of Infection: augmentin x 3 days - called into pharmacy    Discharging Provider: Zora Clayton DO  Contact Info: Cell  - Please call with any questions or concerns.    Outpatient Provider: Dr. June    Time Spent: 45 minutes     Hospital Course  HPI:  42 y/o male with h/o gastric ulcers, chronic Hep B, GERD, gallbladder polyp presented to ED c/o epigastric pain tonight. Pt states pain started at 6:30PM after dinner with associated chills. Pain worse with mylanta. Denies fevers, nausea, chest pain, vomiting, dysuria, melena, diarrhea, or pain radiating to back. Constipated today, but usually normal. Pt presented to ED for similar episodes on 12/8/23, 11/29/23, and  that resolved after taking maalox and pepcid. In ED, afebrile, /84, HR 54, RR 18, spO2 99%. Labs remarkable for WBC 14.83, Hg 17.3, K+ 3.4, AST/ALT 27/56. CT A/P reveal Choledocholithiasis with 3 mm calculus seen within the distal common bile duct, likely associated biliary obstruction as there is associated common bile duct and gallbladder dilatation, Question mild wall thickening of the gastric antrum which may be related to underdistention versus gastritis. Probable constipation. Given 1L bolus NS, pepcid, morphine. Currently, patient states pain and chills has resolved. However is worried that the pain will return.  Of note:  CT A/P 3/2023: Cholelithiasis with small calcified gallstone in the neck of the gallbladder.  US RUQ 4/2023: Unchanged subcentimeter-sized gallbladder mural polyp.   MRCP:7/21/23: No evidence of intra or extrahepatic biliary dilatation. Gallbladder polyp.  (10 Dec 2023 03:56)    Admitted in stable condition. GI consulted, ERCP resulted as below. Patient did not want surgery at this time. Diet advanced as tolerated. Zosyn transition to augmentin to complete 5 days.     Source of Infection: augmentin x 3 days - called into pharmacy    Discharging Provider: Zora Clayton DO  Contact Info: Cell  - Please call with any questions or concerns.    Outpatient Provider: Dr. June    Time Spent: 45 minutes

## 2023-12-12 NOTE — DISCHARGE NOTE NURSING/CASE MANAGEMENT/SOCIAL WORK - NSDCPEFALRISK_GEN_ALL_CORE
For information on Fall & Injury Prevention, visit: [email or web address] OR  [email or web address] OR  https://www.cdc.gov/steadi/patient.html For information on Fall & Injury Prevention, visit: [email or web address] OR  [email or web address] OR  https://www.cdc.gov/steadi/patient.html

## 2023-12-12 NOTE — DISCHARGE NOTE NURSING/CASE MANAGEMENT/SOCIAL WORK - PATIENT PORTAL LINK FT
You can access the FollowMyHealth Patient Portal offered by [facility] by registering at the following website: [email or web address]. By joining [employer]’s FollowMyHealth portal, you will also be able to view your health information using other applications (apps) compatible with our system. You can access the FollowMyHealth Patient Portal offered by [facility] by registering at the following website: [email or web address]. By joining [employer]’s FollowMyHealth portal, you will also be able to view your health information using other applications (apps) compatible with our system.

## 2023-12-12 NOTE — PROGRESS NOTE ADULT - SUBJECTIVE AND OBJECTIVE BOX
Patient is a 43y old  Male who presents with a chief complaint of choledocholithiasis (12 Dec 2023 07:12)      Patient seen and examined at bedside. tolerated CLD. denies headache, fever, chills, cp, sob, n/v, abd pain.      ALLERGIES:  No Known Allergies    MEDICATIONS  (STANDING):  lactated ringers. 1000 milliLiter(s) (100 mL/Hr) IV Continuous <Continuous>  pantoprazole  Injectable 40 milliGRAM(s) IV Push daily  piperacillin/tazobactam IVPB.. 3.375 Gram(s) IV Intermittent every 8 hours  senna 2 Tablet(s) Oral at bedtime  tenofovir disoproxil fumarate (VIREAD) 300 milliGRAM(s) Oral daily    MEDICATIONS  (PRN):  acetaminophen     Tablet .. 650 milliGRAM(s) Oral every 6 hours PRN Temp greater or equal to 38C (100.4F), Mild Pain (1 - 3)  aluminum hydroxide/magnesium hydroxide/simethicone Suspension 30 milliLiter(s) Oral every 4 hours PRN Dyspepsia  melatonin 3 milliGRAM(s) Oral at bedtime PRN Insomnia  ondansetron Injectable 4 milliGRAM(s) IV Push every 8 hours PRN Nausea and/or Vomiting  polyethylene glycol 3350 17 Gram(s) Oral daily PRN Constipation    Vital Signs Last 24 Hrs  T(F): 98.4 (12 Dec 2023 06:01), Max: 98.4 (12 Dec 2023 06:01)  HR: 64 (12 Dec 2023 06:01) (64 - 69)  BP: 93/55 (12 Dec 2023 06:01) (93/55 - 110/66)  RR: 17 (12 Dec 2023 06:01) (17 - 18)  SpO2: 97% (12 Dec 2023 06:01) (96% - 97%)  I&O's Summary    BMI (kg/m2): 29 (12-11-23 @ 08:12), 29.2 (12-08-23 @ 03:55), 29 (12-08-23 @ 03:02)  PHYSICAL EXAM:  General: NAD, A/O x 3  ENT: MMM, no scleral icterus  Neck: Supple, No JVD  Lungs: Clear to auscultation bilaterally, no wheezes, rales, rhonchi  Cardio: RRR, S1/S2  Abdomen: Soft, Nontender, Nondistended; Bowel sounds present  Extremities: No calf tenderness, No pitting edema    LABS:                        15.6   11.39 )-----------( 227      ( 12 Dec 2023 05:35 )             43.4       12-12    143  |  105  |  11  ----------------------------<  90  4.2   |  26  |  0.84    Ca    9.1      12 Dec 2023 05:35    TPro  7.0  /  Alb  3.4  /  TBili  1.8  /  DBili  x   /  AST  29  /  ALT  65  /  AlkPhos  119  12-12       PT/INR - ( 11 Dec 2023 05:57 )   PT: 12.6 sec;   INR: 1.08 ratio         PTT - ( 11 Dec 2023 05:57 )  PTT:33.4 sec     CARDIAC MARKERS ( 10 Dec 2023 10:15 )  x     / 4.3 ng/L / x     / x     / x      CARDIAC MARKERS ( 10 Dec 2023 04:54 )  x     / 5.0 ng/L / x     / x     / x      CARDIAC MARKERS ( 10 Dec 2023 00:40 )  x     / <4.0 ng/L / x     / x     / x            TSH 0.665   TSH with FT4 reflex --  Total T3 --                  Urinalysis Basic - ( 12 Dec 2023 05:35 )    Color: x / Appearance: x / SG: x / pH: x  Gluc: 90 mg/dL / Ketone: x  / Bili: x / Urobili: x   Blood: x / Protein: x / Nitrite: x   Leuk Esterase: x / RBC: x / WBC x   Sq Epi: x / Non Sq Epi: x / Bacteria: x            RADIOLOGY & ADDITIONAL TESTS:  < from: US Abdomen Upper Quadrant Right (12.10.23 @ 10:21) >  FINDINGS:  Liver: Within normal limits.  Bile ducts: Normal caliber. Common bile duct measures 6 mm.  Gallbladder: No cholelithiasis. Gallbladder wall thickening. 0.8 cm   gallbladder polyp.  Pancreas: Evaluation of the pancreas is limited secondary to overlying   bowel gas.  Right kidney: 11.8 cm. No hydronephrosis.  Ascites: None.  IVC: Visualized portions are within normal limits.    IMPRESSION:  No cholelithiasis. Gallbladder wall thickening. 0.8 cmgallbladder polyp.      --- End of Report ---    < end of copied text >  < from: ERCP (12.11.23 @ 13:30) >  Endoscopy Findings:        An upper endoscopy was performed, and the esophagus, stomach and duodenum were    grossly unremarkable with limited visualization ofside viewing scope except for    a small periampullary diverticulum proximal to papilla.        ERCP Findings:        The duodenoscope was passed into the second portion of the duodenum. The major    papilla was identified and appeared unremarkable.Using a short tipped traction    sphincterotome, the bile duct was readily cannulated. A wire was passed into the    bile duct, and a cholangiogram was produced by injecting contrast demonstrating    a patent cystic duct.Using a pulsed cut setting, a sphincterotomy was performed    without complications.A balloon was used to perform a sweep of the bile duct.    Debris was removed and a clearing cholangiogram obtained.        Fluoroscopic Interpretation:        I supervised the acquisition and interpretation of the fluoroscopic images at    the biliary tree. The quality of the images was good. The total fluoro time was    recorderd.  Post procedure film was nl.        Impressions:        Choledocholithiasis.        Plan:        Return to floor for further management        NPO for 4 hrs on IV Lactated Ringer, then if well, clear diet and advance as    tolerated        Anselmo Alvarado MD    < end of copied text >    Care Discussed with Consultants/Other Providers:

## 2024-01-22 ENCOUNTER — APPOINTMENT (OUTPATIENT)
Dept: CARDIOLOGY | Facility: CLINIC | Age: 44
End: 2024-01-22

## 2024-05-05 LAB
ALBUMIN SERPL ELPH-MCNC: 4.8 G/DL
ALP BLD-CCNC: 99 U/L
ALT SERPL-CCNC: 32 U/L
ANION GAP SERPL CALC-SCNC: 23 MMOL/L
APPEARANCE: CLEAR
AST SERPL-CCNC: 33 U/L
BACTERIA: NEGATIVE
BILIRUB SERPL-MCNC: 0.8 MG/DL
BILIRUBIN URINE: NEGATIVE
BLOOD URINE: NEGATIVE
BUN SERPL-MCNC: 13 MG/DL
CALCIUM SERPL-MCNC: 9.5 MG/DL
CHLORIDE SERPL-SCNC: 103 MMOL/L
CHOLEST SERPL-MCNC: 167 MG/DL
CO2 SERPL-SCNC: 14 MMOL/L
COLOR: YELLOW
CREAT SERPL-MCNC: 0.74 MG/DL
EGFR: 117 ML/MIN/1.73M2
GLUCOSE QUALITATIVE U: NEGATIVE
GLUCOSE SERPL-MCNC: 85 MG/DL
HDLC SERPL-MCNC: 41 MG/DL
HYALINE CASTS: 1 /LPF
KETONES URINE: NEGATIVE
LDLC SERPL CALC-MCNC: 106 MG/DL
LEUKOCYTE ESTERASE URINE: NEGATIVE
MAGNESIUM SERPL-MCNC: 2.4 MG/DL
MICROSCOPIC-UA: NORMAL
NITRITE URINE: NEGATIVE
NONHDLC SERPL-MCNC: 126 MG/DL
PH URINE: 7
POTASSIUM SERPL-SCNC: 3.9 MMOL/L
PROT SERPL-MCNC: 7.4 G/DL
PROTEIN URINE: NORMAL
RED BLOOD CELLS URINE: 3 /HPF
SODIUM SERPL-SCNC: 141 MMOL/L
SPECIFIC GRAVITY URINE: 1.02
SQUAMOUS EPITHELIAL CELLS: 0 /HPF
T4 FREE SERPL-MCNC: 1.3 NG/DL
TRIGL SERPL-MCNC: 102 MG/DL
TSH SERPL-ACNC: 0.99 UIU/ML
UROBILINOGEN URINE: NORMAL
WHITE BLOOD CELLS URINE: 0 /HPF

## 2024-05-14 PROBLEM — K21.9 GASTRO-ESOPHAGEAL REFLUX DISEASE WITHOUT ESOPHAGITIS: Chronic | Status: ACTIVE | Noted: 2023-12-10

## 2024-05-14 PROBLEM — K82.4 CHOLESTEROLOSIS OF GALLBLADDER: Chronic | Status: ACTIVE | Noted: 2023-12-10

## 2024-05-15 ENCOUNTER — APPOINTMENT (OUTPATIENT)
Dept: MRI IMAGING | Facility: CLINIC | Age: 44
End: 2024-05-15
Payer: COMMERCIAL

## 2024-05-15 ENCOUNTER — OUTPATIENT (OUTPATIENT)
Dept: OUTPATIENT SERVICES | Facility: HOSPITAL | Age: 44
LOS: 1 days | End: 2024-05-15
Payer: COMMERCIAL

## 2024-05-15 DIAGNOSIS — R10.10 UPPER ABDOMINAL PAIN, UNSPECIFIED: ICD-10-CM

## 2024-05-15 DIAGNOSIS — K80.20 CALCULUS OF GALLBLADDER WITHOUT CHOLECYSTITIS WITHOUT OBSTRUCTION: ICD-10-CM

## 2024-05-15 PROCEDURE — 74183 MRI ABD W/O CNTR FLWD CNTR: CPT | Mod: 26

## 2024-05-15 PROCEDURE — 74183 MRI ABD W/O CNTR FLWD CNTR: CPT

## 2024-05-15 PROCEDURE — A9585: CPT

## 2024-06-24 ENCOUNTER — EMERGENCY (EMERGENCY)
Facility: HOSPITAL | Age: 44
LOS: 1 days | End: 2024-06-24
Admitting: EMERGENCY MEDICINE
Payer: COMMERCIAL

## 2024-06-24 ENCOUNTER — EMERGENCY (EMERGENCY)
Facility: HOSPITAL | Age: 44
LOS: 1 days | Discharge: HOME | End: 2024-06-24
Attending: EMERGENCY MEDICINE | Admitting: EMERGENCY MEDICINE
Payer: COMMERCIAL

## 2024-06-24 VITALS
WEIGHT: 164.91 LBS | SYSTOLIC BLOOD PRESSURE: 124 MMHG | HEIGHT: 68 IN | RESPIRATION RATE: 16 BRPM | OXYGEN SATURATION: 99 % | TEMPERATURE: 97 F | HEART RATE: 55 BPM | DIASTOLIC BLOOD PRESSURE: 68 MMHG

## 2024-06-24 VITALS
HEART RATE: 60 BPM | OXYGEN SATURATION: 99 % | DIASTOLIC BLOOD PRESSURE: 73 MMHG | TEMPERATURE: 98 F | SYSTOLIC BLOOD PRESSURE: 109 MMHG | RESPIRATION RATE: 16 BRPM

## 2024-06-24 PROCEDURE — 12001 RPR S/N/AX/GEN/TRNK 2.5CM/<: CPT

## 2024-06-24 PROCEDURE — 99284 EMERGENCY DEPT VISIT MOD MDM: CPT | Mod: 25

## 2024-06-24 PROCEDURE — 99283 EMERGENCY DEPT VISIT LOW MDM: CPT | Mod: 25

## 2024-06-24 PROCEDURE — 90471 IMMUNIZATION ADMIN: CPT

## 2024-06-24 PROCEDURE — L9991: CPT

## 2024-06-24 PROCEDURE — 90715 TDAP VACCINE 7 YRS/> IM: CPT

## 2024-06-24 RX ORDER — TETANUS TOXOID, REDUCED DIPHTHERIA TOXOID AND ACELLULAR PERTUSSIS VACCINE, ADSORBED 5; 2.5; 8; 8; 2.5 [IU]/.5ML; [IU]/.5ML; UG/.5ML; UG/.5ML; UG/.5ML
0.5 SUSPENSION INTRAMUSCULAR ONCE
Refills: 0 | Status: COMPLETED | OUTPATIENT
Start: 2024-06-24 | End: 2024-06-24

## 2024-06-24 RX ORDER — LIDOCAINE HCL 20 MG/ML
15 VIAL (ML) INJECTION ONCE
Refills: 0 | Status: ACTIVE | OUTPATIENT
Start: 2024-06-24 | End: 2024-06-24

## 2024-06-24 RX ORDER — TRANEXAMIC ACID 100 MG/ML
5 INJECTION, SOLUTION INTRAVENOUS ONCE
Refills: 0 | Status: ACTIVE | OUTPATIENT
Start: 2024-06-24 | End: 2024-06-24

## 2024-06-24 RX ADMIN — TETANUS TOXOID, REDUCED DIPHTHERIA TOXOID AND ACELLULAR PERTUSSIS VACCINE, ADSORBED 0.5 MILLILITER(S): 5; 2.5; 8; 8; 2.5 SUSPENSION INTRAMUSCULAR at 21:33

## 2024-06-24 NOTE — ED PROVIDER NOTE - SKIN, MLM
pt with 1 cm laceration to the palmar side of the middle finger, macerated skin on the 2nd, 4th and 5th fingers.

## 2024-06-24 NOTE — ED ADULT NURSE NOTE - OBJECTIVE STATEMENT
Pt aaox3, came to ED for right ring finger laceration from a table saw. Pt denies numbness or tingling sensation. .

## 2024-06-24 NOTE — ED PROVIDER NOTE - NSFOLLOWUPINSTRUCTIONS_ED_ALL_ED_FT
-- You should update your primary care physician on your Emergency Department visit and follow up with them.  If you do not have a physician or have difficulty following up, please call: [phone] to obtain a [facility] doctor or specialist who can provide follow up.    -- Return to the ER for worsening or persistent symptoms, and/or ANY NEW OR CONCERNING SYMPTOMS.    -- Return to the ER in 10 to 14 days for suture removal.  Keep wound clean and dry, wash every day but then dry completely and apply Band-Aids.

## 2024-06-24 NOTE — ED ADULT TRIAGE NOTE - CHIEF COMPLAINT QUOTE
Patient presents to ED from home with lacerations to right 3rd, right 4th, and right 5th digit. Patient was cutting something doing a "DIY" project and sliced hand with razor blade. Patient tetanus unknown. Patient took 1000mg of Tylenol PTA

## 2024-06-24 NOTE — ED ADULT TRIAGE NOTE - IDEAL BODY WEIGHT(KG)
Earlene Stone, 33year old,  at 25 1/7 weeks if calling the office today on her right side after falling on her right side after slipping on the grass this morning. Denies any pain, leaking or bleeding. Patient has felt fetal movement after the fall. Denies pain. Blood type O+. Will speak with on call MD and call patient back. 69

## 2024-06-24 NOTE — ED PROVIDER NOTE - PATIENT PORTAL LINK FT
You can access the FollowMyHealth Patient Portal offered by [facility] by registering at the following website: [email or web address]. By joining [employer]’s FollowMyHealth portal, you will also be able to view your health information using other applications (apps) compatible with our system.

## 2024-06-24 NOTE — ED PROVIDER NOTE - OBJECTIVE STATEMENT
Patient states that he caught the fingers of his right hand with an electrical wood saw while doing a DIY project at home.  Patient states he can move all his fingers but has not been able to stop the bleeding.

## 2024-09-03 ENCOUNTER — EMERGENCY (EMERGENCY)
Facility: HOSPITAL | Age: 44
LOS: 1 days | Discharge: HOME | End: 2024-09-03
Attending: EMERGENCY MEDICINE | Admitting: EMERGENCY MEDICINE
Payer: COMMERCIAL

## 2024-09-03 VITALS
DIASTOLIC BLOOD PRESSURE: 74 MMHG | TEMPERATURE: 98 F | WEIGHT: 152.34 LBS | OXYGEN SATURATION: 98 % | SYSTOLIC BLOOD PRESSURE: 113 MMHG | HEART RATE: 52 BPM | HEIGHT: 68 IN | RESPIRATION RATE: 18 BRPM

## 2024-09-03 DIAGNOSIS — S61.212A LACERATION WITHOUT FOREIGN BODY OF RIGHT MIDDLE FINGER WITHOUT DAMAGE TO NAIL, INITIAL ENCOUNTER: ICD-10-CM
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
## 2024-09-03 DIAGNOSIS — K80.50 CALCULUS OF BILE DUCT WITHOUT CHOLANGITIS OR CHOLECYSTITIS WITHOUT OBSTRUCTION: ICD-10-CM
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
## 2024-09-03 DIAGNOSIS — Z23 ENCOUNTER FOR IMMUNIZATION: ICD-10-CM

## 2024-09-03 LAB
ALBUMIN SERPL ELPH-MCNC: 4.2 G/DL — SIGNIFICANT CHANGE UP (ref 3.3–5)
ALP SERPL-CCNC: 93 U/L — SIGNIFICANT CHANGE UP (ref 40–120)
ALT FLD-CCNC: 58 U/L — HIGH (ref 10–45)
ANION GAP SERPL CALC-SCNC: 7 MMOL/L — SIGNIFICANT CHANGE UP (ref 5–17)
APPEARANCE UR: CLEAR — SIGNIFICANT CHANGE UP
AST SERPL-CCNC: 24 U/L — SIGNIFICANT CHANGE UP (ref 10–40)
BASOPHILS # BLD AUTO: 0.04 K/UL — SIGNIFICANT CHANGE UP (ref 0–0.2)
BASOPHILS NFR BLD AUTO: 0.6 % — SIGNIFICANT CHANGE UP (ref 0–2)
BILIRUB SERPL-MCNC: 1 MG/DL — SIGNIFICANT CHANGE UP (ref 0.2–1.2)
BILIRUB UR-MCNC: NEGATIVE — SIGNIFICANT CHANGE UP
BUN SERPL-MCNC: 16 MG/DL — SIGNIFICANT CHANGE UP (ref 7–23)
CALCIUM SERPL-MCNC: 9.4 MG/DL — SIGNIFICANT CHANGE UP (ref 8.4–10.5)
CHLORIDE SERPL-SCNC: 103 MMOL/L — SIGNIFICANT CHANGE UP (ref 96–108)
CO2 SERPL-SCNC: 28 MMOL/L — SIGNIFICANT CHANGE UP (ref 22–31)
COLOR SPEC: YELLOW — SIGNIFICANT CHANGE UP
CREAT SERPL-MCNC: 0.72 MG/DL — SIGNIFICANT CHANGE UP (ref 0.5–1.3)
DIFF PNL FLD: NEGATIVE — SIGNIFICANT CHANGE UP
EGFR: 117 ML/MIN/1.73M2 — SIGNIFICANT CHANGE UP
EOSINOPHIL # BLD AUTO: 0.14 K/UL — SIGNIFICANT CHANGE UP (ref 0–0.5)
EOSINOPHIL NFR BLD AUTO: 2 % — SIGNIFICANT CHANGE UP (ref 0–6)
GLUCOSE SERPL-MCNC: 105 MG/DL — HIGH (ref 70–99)
GLUCOSE UR QL: NEGATIVE MG/DL — SIGNIFICANT CHANGE UP
HCT VFR BLD CALC: 43.6 % — SIGNIFICANT CHANGE UP (ref 39–50)
HGB BLD-MCNC: 15.8 G/DL — SIGNIFICANT CHANGE UP (ref 13–17)
IMM GRANULOCYTES NFR BLD AUTO: 0.3 % — SIGNIFICANT CHANGE UP (ref 0–0.9)
KETONES UR-MCNC: 40 MG/DL
LEUKOCYTE ESTERASE UR-ACNC: NEGATIVE — SIGNIFICANT CHANGE UP
LIDOCAIN IGE QN: 38 U/L — SIGNIFICANT CHANGE UP (ref 16–77)
LYMPHOCYTES # BLD AUTO: 0.87 K/UL — LOW (ref 1–3.3)
LYMPHOCYTES # BLD AUTO: 12.2 % — LOW (ref 13–44)
MCHC RBC-ENTMCNC: 30.9 PG — SIGNIFICANT CHANGE UP (ref 27–34)
MCHC RBC-ENTMCNC: 36.2 GM/DL — HIGH (ref 32–36)
MCV RBC AUTO: 85.3 FL — SIGNIFICANT CHANGE UP (ref 80–100)
MONOCYTES # BLD AUTO: 0.39 K/UL — SIGNIFICANT CHANGE UP (ref 0–0.9)
MONOCYTES NFR BLD AUTO: 5.5 % — SIGNIFICANT CHANGE UP (ref 2–14)
NEUTROPHILS # BLD AUTO: 5.66 K/UL — SIGNIFICANT CHANGE UP (ref 1.8–7.4)
NEUTROPHILS NFR BLD AUTO: 79.4 % — HIGH (ref 43–77)
NITRITE UR-MCNC: NEGATIVE — SIGNIFICANT CHANGE UP
NRBC # BLD: 0 /100 WBCS — SIGNIFICANT CHANGE UP (ref 0–0)
PH UR: 6 — SIGNIFICANT CHANGE UP (ref 5–8)
PLATELET # BLD AUTO: 277 K/UL — SIGNIFICANT CHANGE UP (ref 150–400)
POTASSIUM SERPL-MCNC: 3.7 MMOL/L — SIGNIFICANT CHANGE UP (ref 3.5–5.3)
POTASSIUM SERPL-SCNC: 3.7 MMOL/L — SIGNIFICANT CHANGE UP (ref 3.5–5.3)
PROT SERPL-MCNC: 7.5 G/DL — SIGNIFICANT CHANGE UP (ref 6–8.3)
PROT UR-MCNC: SIGNIFICANT CHANGE UP MG/DL
RBC # BLD: 5.11 M/UL — SIGNIFICANT CHANGE UP (ref 4.2–5.8)
RBC # FLD: 11.4 % — SIGNIFICANT CHANGE UP (ref 10.3–14.5)
SODIUM SERPL-SCNC: 138 MMOL/L — SIGNIFICANT CHANGE UP (ref 135–145)
SP GR SPEC: 1.03 — HIGH (ref 1–1.03)
TROPONIN I, HIGH SENSITIVITY RESULT: <4 NG/L — SIGNIFICANT CHANGE UP
UROBILINOGEN FLD QL: 1 MG/DL — SIGNIFICANT CHANGE UP (ref 0.2–1)
WBC # BLD: 7.12 K/UL — SIGNIFICANT CHANGE UP (ref 3.8–10.5)
WBC # FLD AUTO: 7.12 K/UL — SIGNIFICANT CHANGE UP (ref 3.8–10.5)

## 2024-09-03 PROCEDURE — 80053 COMPREHEN METABOLIC PANEL: CPT

## 2024-09-03 PROCEDURE — 36415 COLL VENOUS BLD VENIPUNCTURE: CPT

## 2024-09-03 PROCEDURE — 74177 CT ABD & PELVIS W/CONTRAST: CPT | Mod: MC

## 2024-09-03 PROCEDURE — 74177 CT ABD & PELVIS W/CONTRAST: CPT | Mod: 26,MC

## 2024-09-03 PROCEDURE — 93005 ELECTROCARDIOGRAM TRACING: CPT

## 2024-09-03 PROCEDURE — 84484 ASSAY OF TROPONIN QUANT: CPT

## 2024-09-03 PROCEDURE — 81003 URINALYSIS AUTO W/O SCOPE: CPT

## 2024-09-03 PROCEDURE — 83690 ASSAY OF LIPASE: CPT

## 2024-09-03 PROCEDURE — 85025 COMPLETE CBC W/AUTO DIFF WBC: CPT

## 2024-09-03 PROCEDURE — 93010 ELECTROCARDIOGRAM REPORT: CPT

## 2024-09-03 PROCEDURE — 99285 EMERGENCY DEPT VISIT HI MDM: CPT

## 2024-09-03 PROCEDURE — 99285 EMERGENCY DEPT VISIT HI MDM: CPT | Mod: 25

## 2024-09-03 RX ORDER — SODIUM CHLORIDE 9 MG/ML
500 INJECTION INTRAMUSCULAR; INTRAVENOUS; SUBCUTANEOUS ONCE
Refills: 0 | Status: COMPLETED | OUTPATIENT
Start: 2024-09-03 | End: 2024-09-03

## 2024-09-03 RX ORDER — SODIUM CHLORIDE 9 MG/ML
1000 INJECTION INTRAMUSCULAR; INTRAVENOUS; SUBCUTANEOUS ONCE
Refills: 0 | Status: ACTIVE | OUTPATIENT
Start: 2024-09-03 | End: 2024-09-03

## 2024-09-03 RX ADMIN — SODIUM CHLORIDE 1000 MILLILITER(S): 9 INJECTION INTRAMUSCULAR; INTRAVENOUS; SUBCUTANEOUS at 06:05

## 2024-09-03 NOTE — ED PROVIDER NOTE - PATIENT PORTAL LINK FT
You can access the FollowMyHealth Patient Portal offered by [facility] by registering at the following website: [email or web address]. By joining [employer]’s FollowMyHealth portal, you will also be able to view your health information using other applications (apps) compatible with our system.

## 2024-09-03 NOTE — CONSULT NOTE ADULT - ASSESSMENT
44 y/o male with h/o gastric ulcers, chronic Hep B, GERD, gallbladder polyp , 12/23 Choledocholithiasis S/P ERCP, presented to ED c/o epigastric pain tonight. Pt states pain started at mid night, he took 2 Tylenol did not improve so he took 2 more Tylenol no improvement so he came to ER.    GI Consultation for Choledocholithiasis Abd CT  demonstrates multiple stones in the gallbladder and a punctate stone in the common hepatic duct at the level of the cystic duct.    As per patient he is feeling fine now has to work has urgent meeting today. He understands the CT result and we advise doing ERCP and Surgery consult.   He prefer doing out patient F/U with GI & Surgery for further workup, discussed this with ER attending Dr Barnes and GI Dr Roland .

## 2024-09-03 NOTE — ED PROVIDER NOTE - CLINICAL SUMMARY MEDICAL DECISION MAKING FREE TEXT BOX
Cameron 9AM: Result reviewed of CT.  Patient with punctate stones within the gallbladder as well as within the hepatic duct.  Gastroenterologist consulted.  Nurse practitioner Allen discussed with patient.  Recommend ERCP.  Patient states he wants to go home and will follow-up outpatient understanding risks and benefits.

## 2024-09-03 NOTE — CONSULT NOTE ADULT - SUBJECTIVE AND OBJECTIVE BOX
INTERVAL HPI/OVERNIGHT EVENTS:  HPI: 42 y/o male with h/o gastric ulcers, chronic Hep B, GERD, gallbladder polyp presented to ED c/o epigastric pain tonight. Pt states pain started at mid night, he took 2 Tylenol did not improve so he took 2 more Tylenol no improvement so he came to ER.  He had SIBO testing done by Dr Gigi Dey gastroenterologist, it was positive he is taking medication from last week don't know the name of medication.   He had ERCP done by Dr. Alvarado 2023 in [facility] , He was seen By Surgeon Dr Ella Dozier, was advised Cholecystectomy , he did not want surgery at that time.    Denies fevers, nausea, chest pain, vomiting, dysuria, melena, diarrhea, or pain radiating to back. Constipated today, but usually normal.    As per patient now his pain resolved, he wants to go home, he has some urgent work & meetings, he will do out patient F/U with Dr Alvarado GI & Dr Dozier.       GI Consultation for history of choledocholithiasis presents to the ED with abdominal pain and nausea. CT performed which demonstrates multiple stones in the gallbladder and a punctate stone in the common hepatic duct at the level of the cystic duct. Patient seen examined at bed side, Denies abdominal pain, nausea, vomiting diarrhea or constipation.     MEDICATIONS  (STANDING):  sodium chloride 0.9% Bolus 1000 milliLiter(s) IV Bolus once    MEDICATIONS  (PRN):      Allergies    No Known Allergies    Intolerances        PAST MEDICAL & SURGICAL HISTORY:  Hepatitis B      GERD (gastroesophageal reflux disease)      Gallbladder polyp          REVIEW OF SYSTEMS    See HPI     PHYSICAL EXAM:   Vital Signs:  Vital Signs Last 24 Hrs  T(C): 36.5 (03 Sep 2024 05:21), Max: 36.5 (03 Sep 2024 05:21)  T(F): 97.7 (03 Sep 2024 05:21), Max: 97.7 (03 Sep 2024 05:21)  HR: 52 (03 Sep 2024 05:21) (52 - 52)  BP: 113/74 (03 Sep 2024 05:21) (113/74 - 113/74)  BP(mean): --  RR: 18 (03 Sep 2024 05:21) (18 - 18)  SpO2: 98% (03 Sep 2024 05:21) (98% - 98%)    Parameters below as of 03 Sep 2024 05:21  Patient On (Oxygen Delivery Method): room air      Daily Height in cm: 172.72 (03 Sep 2024 05:21)    Daily I&O's Summary      GENERAL:  Appears stated age  HEENT:  NC/AT,  conjunctivae clear and pink  CHEST:  Full & symmetric excursion  HEART:  Regular rhythm, S1, S2  ABDOMEN:  Soft, non-tender, non-distended, normoactive bowel sounds  EXTREMITIES:  no cyanosis, clubbing or edema  SKIN:  No rash/warm/dry  NEURO:  Alert, oriented    LABS:                        15.8   7.12  )-----------( 277      ( 03 Sep 2024 06:00 )             43.6         138  |  103  |  16  ----------------------------<  105<H>  3.7   |  28  |  0.72    Ca    9.4      03 Sep 2024 06:00    TPro  7.5  /  Alb  4.2  /  TBili  1.0  /  DBili  x   /  AST  24  /  ALT  58<H>  /  AlkPhos  93        Urinalysis Basic - ( 03 Sep 2024 06:00 )    Color: Yellow / Appearance: Clear / S.032 / pH: x  Gluc: 105 mg/dL / Ketone: 40 mg/dL  / Bili: Negative / Urobili: 1.0 mg/dL   Blood: x / Protein: Trace mg/dL / Nitrite: Negative   Leuk Esterase: Negative / RBC: x / WBC x   Sq Epi: x / Non Sq Epi: x / Bacteria: x      amylase   lipaseLipase: 38 U/L ( @ 06:00)    RADIOLOGY & ADDITIONAL TESTS:    ACC: [id number] EXAM:  CT ABDOMEN AND PELVIS IC   ORDERED BY:  DUANE GRANADOS     PROCEDURE DATE:  2024          INTERPRETATION:  CLINICAL INFORMATION: Abdominal pain. History of   choledocholithiasis.    COMPARISON: 5/15/2024 (MR of the abdomen), 12/10/2023 (CT and ultrasound)   and multiple priors.    CONTRAST/COMPLICATIONS:  IV Contrast: Omnipaque 350  90 cc administered   10 cc discarded  Oral Contrast: NONE  Complications: None reported at time of study completion    PROCEDURE:  CT of the Abdomen and Pelvis was performed.  Sagittal and coronal reformats were performed.    FINDINGS:  LOWER CHEST: Within normal limits.    LIVER: Within normal limits.  BILE DUCTS: Normal caliber. A punctate stone at the level of cystic duct.  GALLBLADDER: Tiny polyp. Multiple tiny stones. No wall thickening.  SPLEEN: Within normal limits.  PANCREAS: Within normal limits.  ADRENALS: Within normal limits.  KIDNEYS/URETERS: Within normal limits.    BLADDER: Within normal limits.  REPRODUCTIVE ORGANS: Mild prostatic enlargement.    BOWEL: No bowel obstruction. Appendix is normal.  PERITONEUM/RETROPERITONEUM: Within normal limits.  VESSELS: Within normal limits.  LYMPH NODES: No lymphadenopathy.  ABDOMINAL WALL: Within normal limits.  BONES: Within normal limits.    IMPRESSION:  Tiny polyp and tiny stones in the gallbladder.  A punctate stone in the common hepatic duct at the level of cystic duct.      --- End of Report ---        SKYLAR SHERIFF MD; Attending Radiologist  This document hasbeen electronically signed. Sep  3 2024  7:21AM

## 2024-09-03 NOTE — CONSULT NOTE ADULT - CONSULT REASON
history of choledocholithiasis presents to the ED with abdominal pain and nausea. CT performed which demonstrates multiple stones in the gallbladder and a punctate stone in the common hepatic duct at the level of the cystic duct.

## 2024-09-03 NOTE — ED ADULT NURSE NOTE - OBJECTIVE STATEMENT
Pt is alert, came to the ER due to abdominal pain for about 5 hours now. No nausea or vomiting or fever, diarrhea, dysuria. History of Hepatitis B , , Cholecystectomy.

## 2024-09-03 NOTE — ED PROVIDER NOTE - CARE PROVIDER_API CALL
Anselmo Alvarado  Gastroenterology  [address] [place] [address], [address]  [place] [address]  Phone: [phone]  Fax: [phone]  Follow Up Time:

## 2024-09-03 NOTE — ED PROVIDER NOTE - NSFOLLOWUPINSTRUCTIONS_ED_ALL_ED_FT
There is gallstones within the gallbladder as well as within the hepatic duct.  The gastroenterologist recommended admission for ERCP however you have opted to go home.  Understanding the risks, you have reassured us that you would follow-up outpatient with her gastroenterologist.  We will include the information for you to arrange her appointment.  Return to the emergency department if there is worsening or concerns.

## 2024-09-03 NOTE — ED PROVIDER NOTE - OBJECTIVE STATEMENT
43-year-old male with epigastric pain since midnight today.  Reports nausea, denies vomiting, diarrhea, dysuria, trauma, fever, shortness of breath.  Denies any other symptoms.    H/o Choledocholithiasis s/p ERCP in 12/2023.

## 2024-09-06 ENCOUNTER — APPOINTMENT (OUTPATIENT)
Dept: SURGERY | Facility: CLINIC | Age: 44
End: 2024-09-06

## 2024-09-06 VITALS
TEMPERATURE: 97.2 F | HEIGHT: 68 IN | RESPIRATION RATE: 18 BRPM | OXYGEN SATURATION: 99 % | WEIGHT: 153 LBS | DIASTOLIC BLOOD PRESSURE: 62 MMHG | SYSTOLIC BLOOD PRESSURE: 100 MMHG | BODY MASS INDEX: 23.19 KG/M2 | HEART RATE: 68 BPM

## 2024-09-06 DIAGNOSIS — K80.20 CALCULUS OF GALLBLADDER W/OUT CHOLECYSTITIS W/OUT OBSTRUCTION: ICD-10-CM

## 2024-09-06 DIAGNOSIS — K82.4 CHOLESTEROLOSIS OF GALLBLADDER: ICD-10-CM

## 2024-09-06 PROCEDURE — 99215 OFFICE O/P EST HI 40 MIN: CPT

## 2024-09-09 NOTE — PHYSICAL EXAM
[No Rash or Lesion] : No rash or lesion [Alert] : alert [Oriented to Person] : oriented to person [Oriented to Place] : oriented to place [Oriented to Time] : oriented to time [Calm] : calm [de-identified] : No acute distress [de-identified] : Nonlabored breathing [de-identified] : soft, non-tender, non-distended

## 2024-09-09 NOTE — ASSESSMENT
[FreeTextEntry1] : This is a very pleasant 43M with a PMH significant for a hx of choledocholithiasis requiring ERCP with recent recurrent stone in hepatic duct resulting in epigastric discomfort [facility] brought him to the ER. She was seen by GI (Dr. Alvarado) and an ERCP was recommended. However, he left AMA as he had a meeting to attend. He is here for follow up. He actually had an ERCP in the past by Dr. Alvarado and was hospitalized at the time. I had seen her for general surgery consultation at the time and had recommended a robotic cholecystectomy prior to discharge to prevent future similar occurrences. However, he refused at the time and said he would accept surgery after another episode. He is now ready for surgical intervention.  Risks and benefits of robotic cholecystectomy, possible open and all indicated procedures were discussed and the patient would like to proceed with operative intervention.  I explained he would need to see Dr. Alvarado for evaluation and ERCP prior to surgical intervention. He already has n appointment for next week, which is the earliest appointment he could get.  Once he has his ERCP he will call to schedule his surgery.  He will obtain PCP clearance preoperatively.

## 2024-09-09 NOTE — HISTORY OF PRESENT ILLNESS
[de-identified] : This is a very pleasant 43M with a PMH significant for a hx of choledocholithiasis requiring ERCP with recent recurrent stone in hepatic duct resulting in epigastric discomfort [facility] brought him to the ER. She was seen by GI (Dr. Alvarado) and an ERCP was recommended. However, he left AMA as he had a meeting to attend. He is here for follow up. He actually had an ERCP in the past by Dr. Alvarado and was hospitalized at the time. I had seen her for general surgery consultation at the time and had recommended a robotic cholecystectomy prior to discharge to prevent future similar occurrences. However, he refused at the time and said he would accept surgery after another episode. He is now ready for surgical intervention. Clinically, his epigastric discomfort has largely resolved. It used to be more severe in nature. However, now it is minimally symptomatic. No fevers, chills, sweats, nausea, vomiting, diarrhea, jarret-colored stool, dark urine or other concerning issues.   He is healthy at baseline, without chest pain, shortness of breath or nausea with activity. However, he did have a history of a palpitations and was evaluated by multiple cardiologists in the past. He underwent numerous tests including echo and holter monitor which were all normal.

## 2024-09-12 ENCOUNTER — APPOINTMENT (OUTPATIENT)
Dept: GASTROENTEROLOGY | Facility: CLINIC | Age: 44
End: 2024-09-12
Payer: COMMERCIAL

## 2024-09-12 VITALS
SYSTOLIC BLOOD PRESSURE: 107 MMHG | BODY MASS INDEX: 23.19 KG/M2 | RESPIRATION RATE: 16 BRPM | HEIGHT: 68 IN | OXYGEN SATURATION: 99 % | DIASTOLIC BLOOD PRESSURE: 68 MMHG | HEART RATE: 67 BPM | WEIGHT: 153 LBS | TEMPERATURE: 98 F

## 2024-09-12 DIAGNOSIS — K80.50 CALCULUS OF BILE DUCT W/OUT CHOLANGITIS OR CHOLECYSTITIS W/OUT OBSTRUCTION: ICD-10-CM

## 2024-09-12 PROCEDURE — 99213 OFFICE O/P EST LOW 20 MIN: CPT

## 2024-09-12 NOTE — PHYSICAL EXAM
[Alert] : alert [Normal Voice/Communication] : normal voice/communication [Healthy Appearing] : healthy appearing [No Acute Distress] : no acute distress [Sclera] : the sclera and conjunctiva were normal [Hearing Threshold Finger Rub Not [place]] : hearing was normal [Normal Lips/Gums] : the lips and gums were normal [Oropharynx] : the oropharynx was normal [Normal Appearance] : the appearance of the neck was normal [No Neck Mass] : no neck mass was observed [No Respiratory Distress] : no respiratory distress [No Acc Muscle Use] : no accessory muscle use [Respiration, Rhythm And Depth] : normal respiratory rhythm and effort [Auscultation Breath Sounds / Voice Sounds] : lungs were clear to auscultation bilaterally [Heart Rate And Rhythm] : heart rate was normal and rhythm regular [Normal S1, S2] : normal S1 and S2 [Murmurs] : no murmurs [Bowel Sounds] : normal bowel sounds [Abdomen Tenderness] : non-tender [No Masses] : no abdominal mass palpated [Abdomen Soft] : soft [] : no hepatosplenomegaly [Cervical Lymph Nodes Enlarged Posterior Bilaterally] : no posterior cervical lymphadenopathy [No CVA Tenderness] : no CVA  tenderness [Abnormal Walk] : normal gait [Normal Color / Pigmentation] : normal skin color and pigmentation [Motor Exam] : the motor exam was normal [Oriented To Time, Place, And Person] : oriented to person, place, and time

## 2024-09-12 NOTE — PHYSICAL EXAM
[Alert] : alert [Normal Voice/Communication] : normal voice/communication [Healthy Appearing] : healthy appearing [No Acute Distress] : no acute distress [Sclera] : the sclera and conjunctiva were normal [Hearing Threshold Finger Rub Not [place]] : hearing was normal [Normal Lips/Gums] : the lips and gums were normal [Oropharynx] : the oropharynx was normal [Normal Appearance] : the appearance of the neck was normal [No Neck Mass] : no neck mass was observed [No Respiratory Distress] : no respiratory distress [No Acc Muscle Use] : no accessory muscle use [Respiration, Rhythm And Depth] : normal respiratory rhythm and effort [Auscultation Breath Sounds / Voice Sounds] : lungs were clear to auscultation bilaterally [Heart Rate And Rhythm] : heart rate was normal and rhythm regular [Normal S1, S2] : normal S1 and S2 [Murmurs] : no murmurs [Bowel Sounds] : normal bowel sounds [Abdomen Tenderness] : non-tender [No Masses] : no abdominal mass palpated [Abdomen Soft] : soft [] : no hepatosplenomegaly [Cervical Lymph Nodes Enlarged Posterior Bilaterally] : no posterior cervical lymphadenopathy [No CVA Tenderness] : no CVA  tenderness [Abnormal Walk] : normal gait [Normal Color / Pigmentation] : normal skin color and pigmentation [Motor Exam] : the motor exam was normal [Oriented To Time, Place, And Person] : oriented to person, place, and time

## 2024-09-20 ENCOUNTER — NON-APPOINTMENT (OUTPATIENT)
Age: 44
End: 2024-09-20

## 2024-09-24 ENCOUNTER — OUTPATIENT (OUTPATIENT)
Dept: OUTPATIENT SERVICES | Facility: HOSPITAL | Age: 44
LOS: 1 days | End: 2024-09-24
Payer: COMMERCIAL

## 2024-09-24 ENCOUNTER — APPOINTMENT (OUTPATIENT)
Dept: GASTROENTEROLOGY | Facility: HOSPITAL | Age: 44
End: 2024-09-24

## 2024-09-24 ENCOUNTER — TRANSCRIPTION ENCOUNTER (OUTPATIENT)
Age: 44
End: 2024-09-24

## 2024-09-24 VITALS
OXYGEN SATURATION: 99 % | WEIGHT: 153 LBS | SYSTOLIC BLOOD PRESSURE: 97 MMHG | TEMPERATURE: 98 F | RESPIRATION RATE: 18 BRPM | HEIGHT: 68 IN | DIASTOLIC BLOOD PRESSURE: 64 MMHG | HEART RATE: 55 BPM

## 2024-09-24 DIAGNOSIS — K80.50 CALCULUS OF BILE DUCT WITHOUT CHOLANGITIS OR CHOLECYSTITIS WITHOUT OBSTRUCTION: ICD-10-CM

## 2024-09-24 DIAGNOSIS — Z98.890 OTHER SPECIFIED POSTPROCEDURAL STATES: Chronic | ICD-10-CM

## 2024-09-24 DIAGNOSIS — Z01.818 ENCOUNTER FOR OTHER PREPROCEDURAL EXAMINATION: ICD-10-CM

## 2024-09-24 LAB — BLD GP AB SCN SERPL QL: SIGNIFICANT CHANGE UP

## 2024-09-24 PROCEDURE — 36415 COLL VENOUS BLD VENIPUNCTURE: CPT

## 2024-09-24 PROCEDURE — 74330 X-RAY BILE/PANC ENDOSCOPY: CPT

## 2024-09-24 PROCEDURE — G0463: CPT

## 2024-09-24 PROCEDURE — 43264 ERCP REMOVE DUCT CALCULI: CPT

## 2024-09-24 PROCEDURE — C1769: CPT

## 2024-09-24 PROCEDURE — 86850 RBC ANTIBODY SCREEN: CPT

## 2024-09-24 PROCEDURE — 43264 ERCP REMOVE DUCT CALCULI: CPT | Mod: 59

## 2024-09-24 PROCEDURE — 43262 ENDO CHOLANGIOPANCREATOGRAPH: CPT

## 2024-09-24 PROCEDURE — 86901 BLOOD TYPING SEROLOGIC RH(D): CPT

## 2024-09-24 PROCEDURE — 86900 BLOOD TYPING SEROLOGIC ABO: CPT

## 2024-09-24 PROCEDURE — C1773: CPT

## 2024-09-24 DEVICE — GWIRE EXT JAGWIRE 0.035 X 260CM: Type: IMPLANTABLE DEVICE | Status: FUNCTIONAL

## 2024-09-24 DEVICE — CATH BLLN BIL RX 9-12CM: Type: IMPLANTABLE DEVICE | Status: FUNCTIONAL

## 2024-09-24 DEVICE — HYDRATOME 44: Type: IMPLANTABLE DEVICE | Status: FUNCTIONAL

## 2024-09-24 DEVICE — SPHINCTEROTOME W/ GWIRE COMPLETECTRL 20MM SHORT: Type: IMPLANTABLE DEVICE | Status: FUNCTIONAL

## 2024-09-24 RX ORDER — SODIUM CHLORIDE 0.9 % (FLUSH) 0.9 %
500 SYRINGE (ML) INJECTION
Refills: 0 | Status: COMPLETED | OUTPATIENT
Start: 2024-09-24 | End: 2024-09-24

## 2024-09-24 RX ADMIN — Medication 75 MILLILITER(S): at 10:13

## 2024-09-24 NOTE — H&P PST ADULT - HISTORY OF PRESENT ILLNESS
42 y/o male with PMH hepatitis B and GERD presents for PST.  C/O intermittent abdominal pain resulting in ED evaluations and dx of gallstones with recommendation for surgery.  Denies any recent cough fever or acute illness and is otherwise well at PST today.  Schedule for robotic assisted cholecystectomy, possible open and all indicated procedures with Dr Dozier on 09/27/2024.   42 y/o male with PMH hepatitis B and GERD presents for PST.  C/O intermittent abdominal pain resulting in ED evaluation and recommendation for surgery.  Denies any recent cough fever or acute illness and is otherwise well at PST today.  Schedule for robotic assisted cholecystectomy, possible open and all indicated procedures with Dr Dozier on 09/27/2024.

## 2024-09-24 NOTE — H&P PST ADULT - PROBLEM SELECTOR PLAN 1
Schedule for robotic assisted cholecystectomy, possible open and all indicated procedures with Dr Dozier on 09/27/2024.    Pre op instructions given and patient verbalized understanding.  CBC, CMP, EKG on chart.  T&S pending.  NPO after midnight before procedure.  To stop all ASA, NSAIDs, vitamins and supplements 1 week prior to procedure.  Chlorhexidene wash given with instructions.  IS given with teaching Schedule for robotic assisted cholecystectomy, possible open and all indicated procedures with Dr Dozier on 09/27/2024.    Pre op instructions given and patient verbalized understanding.  CBC, CMP, EKG on chart.  T&S pending.  NPO after midnight before procedure.   May take Voquezna AM of procedure with small sip of water. To stop all ASA, NSAIDs, vitamins and supplements 1 week prior to procedure.  Chlorhexidene wash given with instructions.  IS given with teaching

## 2024-09-24 NOTE — H&P PST ADULT - NSANTHOSAYNRD_GEN_A_CORE
neck 14 inches/No. SALBADOR screening performed.  STOP BANG Legend: 0-2 = LOW Risk; 3-4 = INTERMEDIATE Risk; 5-8 = HIGH Risk

## 2024-09-26 ENCOUNTER — TRANSCRIPTION ENCOUNTER (OUTPATIENT)
Age: 44
End: 2024-09-26

## 2024-09-26 NOTE — ASU PATIENT PROFILE, ADULT - FALL HARM RISK - UNIVERSAL INTERVENTIONS
Bed in lowest position, wheels locked, appropriate side rails in place/Call bell, personal items and telephone in reach/Instruct patient to call for assistance before getting out of bed or chair/Non-slip footwear when patient is out of bed/[place] to call system/Physically safe environment - no spills, clutter or unnecessary equipment/Purposeful Proactive Rounding/Room/bathroom lighting operational, light cord in reach

## 2024-09-27 ENCOUNTER — RESULT REVIEW (OUTPATIENT)
Age: 44
End: 2024-09-27

## 2024-09-27 ENCOUNTER — TRANSCRIPTION ENCOUNTER (OUTPATIENT)
Age: 44
End: 2024-09-27

## 2024-09-27 ENCOUNTER — OUTPATIENT (OUTPATIENT)
Dept: OUTPATIENT SERVICES | Facility: HOSPITAL | Age: 44
LOS: 1 days | End: 2024-09-27
Payer: COMMERCIAL

## 2024-09-27 ENCOUNTER — APPOINTMENT (OUTPATIENT)
Dept: SURGERY | Facility: HOSPITAL | Age: 44
End: 2024-09-27

## 2024-09-27 VITALS
HEART RATE: 55 BPM | HEIGHT: 68 IN | RESPIRATION RATE: 15 BRPM | TEMPERATURE: 97 F | DIASTOLIC BLOOD PRESSURE: 63 MMHG | OXYGEN SATURATION: 97 % | WEIGHT: 153 LBS | SYSTOLIC BLOOD PRESSURE: 104 MMHG

## 2024-09-27 VITALS
TEMPERATURE: 97 F | DIASTOLIC BLOOD PRESSURE: 65 MMHG | HEART RATE: 62 BPM | RESPIRATION RATE: 16 BRPM | SYSTOLIC BLOOD PRESSURE: 108 MMHG | OXYGEN SATURATION: 97 %

## 2024-09-27 DIAGNOSIS — Z98.890 OTHER SPECIFIED POSTPROCEDURAL STATES: Chronic | ICD-10-CM

## 2024-09-27 DIAGNOSIS — K80.50 CALCULUS OF BILE DUCT WITHOUT CHOLANGITIS OR CHOLECYSTITIS WITHOUT OBSTRUCTION: ICD-10-CM

## 2024-09-27 DIAGNOSIS — Z01.818 ENCOUNTER FOR OTHER PREPROCEDURAL EXAMINATION: ICD-10-CM

## 2024-09-27 PROCEDURE — C1889: CPT

## 2024-09-27 PROCEDURE — 88304 TISSUE EXAM BY PATHOLOGIST: CPT | Mod: 26

## 2024-09-27 PROCEDURE — 47563 LAPARO CHOLECYSTECTOMY/GRAPH: CPT | Mod: AS

## 2024-09-27 PROCEDURE — 88304 TISSUE EXAM BY PATHOLOGIST: CPT

## 2024-09-27 PROCEDURE — C9399: CPT

## 2024-09-27 PROCEDURE — 47563 LAPARO CHOLECYSTECTOMY/GRAPH: CPT

## 2024-09-27 DEVICE — LIGATING CLIPS WECK HEMOLOK POLYMER MEDIUM-LARGE (GREEN) 6: Type: IMPLANTABLE DEVICE | Status: FUNCTIONAL

## 2024-09-27 RX ORDER — SODIUM CHLORIDE IRRIG SOLUTION 0.9 %
1000 SOLUTION, IRRIGATION IRRIGATION
Refills: 0 | Status: DISCONTINUED | OUTPATIENT
Start: 2024-09-27 | End: 2024-09-27

## 2024-09-27 RX ORDER — ONDANSETRON HCL/PF 4 MG/2 ML
4 VIAL (ML) INJECTION ONCE
Refills: 0 | Status: DISCONTINUED | OUTPATIENT
Start: 2024-09-27 | End: 2024-09-27

## 2024-09-27 RX ORDER — FAMOTIDINE 40 MG
1 TABLET ORAL
Refills: 0 | DISCHARGE

## 2024-09-27 RX ORDER — OXYCODONE HYDROCHLORIDE 30 MG/1
1 TABLET, FILM COATED, EXTENDED RELEASE ORAL
Qty: 10 | Refills: 0
Start: 2024-09-27

## 2024-09-27 RX ORDER — HYDROMORPHONE HYDROCHLORIDE 1 MG/ML
1 INJECTION, SOLUTION INTRAMUSCULAR; INTRAVENOUS; SUBCUTANEOUS ONCE
Refills: 0 | Status: DISCONTINUED | OUTPATIENT
Start: 2024-09-27 | End: 2024-09-27

## 2024-09-27 RX ORDER — HYDROMORPHONE HYDROCHLORIDE 1 MG/ML
0.5 INJECTION, SOLUTION INTRAMUSCULAR; INTRAVENOUS; SUBCUTANEOUS ONCE
Refills: 0 | Status: DISCONTINUED | OUTPATIENT
Start: 2024-09-27 | End: 2024-09-27

## 2024-09-27 RX ORDER — VONOPRAZAN FUMARATE 26.72 MG/1
1 TABLET ORAL
Refills: 0 | DISCHARGE

## 2024-09-27 RX ORDER — OXYCODONE HYDROCHLORIDE 30 MG/1
5 TABLET, FILM COATED, EXTENDED RELEASE ORAL ONCE
Refills: 0 | Status: DISCONTINUED | OUTPATIENT
Start: 2024-09-27 | End: 2024-09-27

## 2024-09-27 RX ADMIN — Medication 50 MILLILITER(S): at 06:46

## 2024-09-27 NOTE — ASU DISCHARGE PLAN (ADULT/PEDIATRIC) - CARE PROVIDER_API CALL
Ella Dozier  Surgery  [address] [facility] [address], [address]  [place] [address]  Phone: [phone]  Fax: [phone]  Follow Up Time: 2 weeks

## 2024-09-27 NOTE — ASU DISCHARGE PLAN (ADULT/PEDIATRIC) - NURSING INSTRUCTIONS
Drink plenty of fluids. Keep all post -op follow up instructions. Call MD with any questions or concerns.

## 2024-09-27 NOTE — ASU DISCHARGE PLAN (ADULT/PEDIATRIC) - ASU DC SPECIAL INSTRUCTIONSFT
May shower in 24-48 hours. Do not scrub or submerge incision in water. Allow soapy water to run over incision and pat to dry with a clean towel. You have glue over your incision site, it will peel off in 1-2 weeks, do not pick at it.  Follow up with Dr. Dozier in 2 weeks, call office to schedule appointment. May call physician sooner with any questions or concerns.    May take over the counter Tylenol 650-975mg every 6 hours alternating with Motrin 600mg every 6 hours as needed for pain. Can take additional Oxycodone as prescribed for severe pain as needed. Do not drive while taking prescribed narcotic pain medications.    Please notify MD sooner or return to ER with any new or worsening symptoms, uncontrollable pain, foul smelling discharge or drainage from wound, excessive bleeding or swelling, abdominal pain, nausea/vomiting, or other concerns/problems.

## 2024-09-30 ENCOUNTER — NON-APPOINTMENT (OUTPATIENT)
Age: 44
End: 2024-09-30

## 2024-10-04 LAB — SURGICAL PATHOLOGY STUDY: SIGNIFICANT CHANGE UP

## 2024-10-10 ENCOUNTER — APPOINTMENT (OUTPATIENT)
Dept: SURGERY | Facility: CLINIC | Age: 44
End: 2024-10-10
Payer: COMMERCIAL

## 2024-10-10 VITALS
BODY MASS INDEX: 23.19 KG/M2 | SYSTOLIC BLOOD PRESSURE: 100 MMHG | DIASTOLIC BLOOD PRESSURE: 58 MMHG | TEMPERATURE: 98.3 F | OXYGEN SATURATION: 97 % | WEIGHT: 153 LBS | RESPIRATION RATE: 16 BRPM | HEART RATE: 59 BPM | HEIGHT: 68 IN

## 2024-10-10 DIAGNOSIS — Z90.49 ACQUIRED ABSENCE OF OTHER SPECIFIED PARTS OF DIGESTIVE TRACT: ICD-10-CM

## 2024-10-10 PROCEDURE — 99024 POSTOP FOLLOW-UP VISIT: CPT

## 2024-10-15 PROBLEM — Z90.49 S/P LAPAROSCOPIC CHOLECYSTECTOMY: Status: ACTIVE | Noted: 2024-10-15

## 2024-10-15 NOTE — ASSESSMENT
[FreeTextEntry1] : This is a very pleasant 43M who is here for post op follow up s/p cholecystectomy. He is progressing well.  Follow up PRN from surgery standpoint. Pathology reviewed with the patient.

## 2024-10-15 NOTE — HISTORY OF PRESENT ILLNESS
[de-identified] : This is a very pleasant 43M with a PMH significant for a hx of choledocholithiasis requiring ERCP with recent recurrent stone in hepatic duct resulting in epigastric discomfort which brought him to the ER. She was seen by GI (Dr. Flores) and an ERCP was recommended. However, he left AMA as he had a meeting to attend. He is here for follow up. He actually had an ERCP in the past by Dr. Flores and was hospitalized at the time. I had seen her for general surgery consultation at the time and had recommended a robotic cholecystectomy prior to discharge to prevent future similar occurrences. However, he refused at the time and said he would accept surgery after another episode.  He is now here s/p elective robotic cholecystectomy. He is doing well. No further epigastric discomfort since the surgery.  No fevers, chills or sweats. No nausea, vomiting or PO intolerance. No pain from incision sites.

## 2025-03-25 ENCOUNTER — APPOINTMENT (OUTPATIENT)
Dept: ULTRASOUND IMAGING | Facility: CLINIC | Age: 45
End: 2025-03-25
Payer: COMMERCIAL

## 2025-03-25 ENCOUNTER — OUTPATIENT (OUTPATIENT)
Dept: OUTPATIENT SERVICES | Facility: HOSPITAL | Age: 45
LOS: 1 days | End: 2025-03-25
Payer: COMMERCIAL

## 2025-03-25 DIAGNOSIS — Z98.890 OTHER SPECIFIED POSTPROCEDURAL STATES: Chronic | ICD-10-CM

## 2025-03-25 DIAGNOSIS — B18.1 CHRONIC VIRAL HEPATITIS B WITHOUT DELTA-AGENT: ICD-10-CM

## 2025-03-25 PROCEDURE — 76700 US EXAM ABDOM COMPLETE: CPT | Mod: 26

## 2025-03-25 PROCEDURE — 76856 US EXAM PELVIC COMPLETE: CPT

## 2025-03-25 PROCEDURE — 76856 US EXAM PELVIC COMPLETE: CPT | Mod: 26

## 2025-03-25 PROCEDURE — 76700 US EXAM ABDOM COMPLETE: CPT

## 2025-04-04 RX ORDER — DOXYCYCLINE 100 MG/1
100 CAPSULE ORAL
Qty: 28 | Refills: 0 | Status: ACTIVE | COMMUNITY
Start: 2025-04-04 | End: 1900-01-01

## 2025-04-06 LAB
APPEARANCE: CLEAR
BACTERIA UR CULT: NORMAL
BACTERIA: NEGATIVE /HPF
BILIRUBIN URINE: NEGATIVE
BLOOD URINE: NEGATIVE
CALCIUM OXALATE CRYSTALS: PRESENT
CAST: 0 /LPF
COLOR: YELLOW
EPITHELIAL CELLS: 0 /HPF
GLUCOSE QUALITATIVE U: NEGATIVE MG/DL
KETONES URINE: NEGATIVE MG/DL
LEUKOCYTE ESTERASE URINE: NEGATIVE
MICROSCOPIC-UA: NORMAL
NITRITE URINE: NEGATIVE
PH URINE: 6.5
PROTEIN URINE: NEGATIVE MG/DL
RED BLOOD CELLS URINE: 1 /HPF
REVIEW: NORMAL
SPECIFIC GRAVITY URINE: 1.02
UROBILINOGEN URINE: 0.2 MG/DL
WHITE BLOOD CELLS URINE: 0 /HPF

## 2025-04-23 ENCOUNTER — APPOINTMENT (OUTPATIENT)
Dept: UROLOGY | Facility: CLINIC | Age: 45
End: 2025-04-23
Payer: COMMERCIAL

## 2025-04-23 VITALS
OXYGEN SATURATION: 98 % | DIASTOLIC BLOOD PRESSURE: 73 MMHG | HEART RATE: 63 BPM | SYSTOLIC BLOOD PRESSURE: 113 MMHG | WEIGHT: 156 LBS | HEIGHT: 68 IN | BODY MASS INDEX: 23.64 KG/M2

## 2025-04-23 DIAGNOSIS — N41.9 INFLAMMATORY DISEASE OF PROSTATE, UNSPECIFIED: ICD-10-CM

## 2025-04-23 DIAGNOSIS — Z12.5 ENCOUNTER FOR SCREENING FOR MALIGNANT NEOPLASM OF PROSTATE: ICD-10-CM

## 2025-04-23 DIAGNOSIS — N13.8 BENIGN PROSTATIC HYPERPLASIA WITH LOWER URINARY TRACT SYMPMS: ICD-10-CM

## 2025-04-23 DIAGNOSIS — N40.1 BENIGN PROSTATIC HYPERPLASIA WITH LOWER URINARY TRACT SYMPMS: ICD-10-CM

## 2025-04-23 LAB
PSA FREE FLD-MCNC: 28 %
PSA FREE SERPL-MCNC: 0.43 NG/ML
PSA SERPL-MCNC: 1.53 NG/ML

## 2025-04-23 PROCEDURE — G2211 COMPLEX E/M VISIT ADD ON: CPT | Mod: NC

## 2025-04-23 PROCEDURE — 99214 OFFICE O/P EST MOD 30 MIN: CPT

## 2025-04-23 NOTE — HISTORY OF PRESENT ILLNESS
[FreeTextEntry1] : LIZETTE PADGETT returns to the office today. He is a 44 year-old man who I met in 2023 for lower urinary tract symptoms. CT abdomen and pelvis had shown a mildly enlarged prostate. He was given Doxycycline for possible prostatitis.  He called the office earlier this month for lower abdominal pain after urinating and nocturia x3-4. UC was negative. He was prescribed Doxycycline. He is feeling better but he has been experiencing nocturia x1, urinary frequency in the morning, urinary urgency. He has a strong stream. He denies constipation.   PSA in April 2023 was 1.48ng/mL. Ultrasound in March showed a volume of 35mL.

## 2025-04-23 NOTE — ASSESSMENT
[FreeTextEntry1] : His symptoms have significantly improved after the use of doxycycline however that he does still have some mild discomfort associated with ejaculation.  I provided him with another prescription for doxycycline and given instructions to start it again if he does not see continued resolution of the residual discomfort over the next 7 to 10 days.  His PSA had been noted to be mildly elevated given his relatively young age and I recommended that we repeat the level today for surveillance.  If there is more significant elevation, MRI of the prostate would be considered to further evaluate.

## (undated) DEVICE — KIT ENDO PROCEDURE CUST W/VLV

## (undated) DEVICE — INJ SYS RAP REFIL

## (undated) DEVICE — TUBING W FILTER STERILE FOR INSUFFLATION

## (undated) DEVICE — DVC AUTO CAP RX LOKG

## (undated) DEVICE — INSUFFLATION NDL COVIDIEN STEP 14G FOR STEP/VERSASTEP

## (undated) DEVICE — TROCAR COVIDIEN VERSASTEP 5MM STANDARD

## (undated) DEVICE — GLV 6.5 PROTEXIS (WHITE)

## (undated) DEVICE — SOL IRR POUR H2O 1000ML

## (undated) DEVICE — POSITIONER FOAM HEAD CRADLE (PINK)

## (undated) DEVICE — TUBING IV EXTENSION 30"

## (undated) DEVICE — SOL IRR POUR H2O 1500ML

## (undated) DEVICE — LOK DVC RX AND BIOPSY

## (undated) DEVICE — SUT POLYSORB 0 30" GS-23

## (undated) DEVICE — LAP PAD 18 X 18"

## (undated) DEVICE — Device

## (undated) DEVICE — DRSG STERISTRIPS 0.5 X 4"

## (undated) DEVICE — TUBING CAP SET ERBEFLO CLEVERCAP HYBRID CO2 FOR OLYMPUS SCOPES AND UCR

## (undated) DEVICE — SOLIDIFIER CANN EXPRESS 3K

## (undated) DEVICE — CONTAINER FORMALIN BUFF 10% 60ML

## (undated) DEVICE — VENODYNE/SCD SLEEVE CALF MEDIUM

## (undated) DEVICE — GLV 7.5 PROTEXIS (WHITE)

## (undated) DEVICE — PLATE NESSY 170

## (undated) DEVICE — TROCAR COVIDIEN VERSAONE BLADED FIXATION 11MM STANDARD

## (undated) DEVICE — POSITIONER STRAP ARMBOARD VELCRO TS-30

## (undated) DEVICE — PACK MINOR

## (undated) DEVICE — ENDOCATCH 10MM SPECIMEN POUCH

## (undated) DEVICE — DRAPE LIGHT HANDLE COVER (GREEN)

## (undated) DEVICE — SPECIMEN CONTAINER PET

## (undated) DEVICE — TROCAR APPLIED MEDICAL KII BALLOON BLUNT TIP 12MM X 100MM

## (undated) DEVICE — TUBING HYDRO-SURG PLUS IRRIGATOR W SMOKEVAC & PROBE

## (undated) DEVICE — GLV 8 PROTEXIS (WHITE)

## (undated) DEVICE — SOL IRR POUR NS 0.9% 500ML

## (undated) DEVICE — WARMING BLANKET UPPER ADULT

## (undated) DEVICE — TUBING INSUFLATOR VIDEO TOWER NON HEATED

## (undated) DEVICE — SNARE OVAL LOOP MICOR

## (undated) DEVICE — PREP CHLORAPREP HI-LITE ORANGE 26ML

## (undated) DEVICE — PRESSURE INFUSOR BAG 1000ML

## (undated) DEVICE — SCOPE WARMER SEAL DISP

## (undated) DEVICE — DISSECTOR ENDO PEANUT 5MM

## (undated) DEVICE — CANISTER SUCTION LID GUARD 3000CC

## (undated) DEVICE — TROCAR COVIDIEN VERSASTEP PLUS 11MM STANDARD